# Patient Record
Sex: FEMALE | Race: WHITE | NOT HISPANIC OR LATINO | URBAN - METROPOLITAN AREA
[De-identification: names, ages, dates, MRNs, and addresses within clinical notes are randomized per-mention and may not be internally consistent; named-entity substitution may affect disease eponyms.]

---

## 2019-06-14 ENCOUNTER — OUTPATIENT (OUTPATIENT)
Dept: OUTPATIENT SERVICES | Facility: HOSPITAL | Age: 39
LOS: 1 days | End: 2019-06-14
Payer: MEDICAID

## 2019-06-14 ENCOUNTER — APPOINTMENT (OUTPATIENT)
Dept: OBGYN | Facility: CLINIC | Age: 39
End: 2019-06-14
Payer: MEDICAID

## 2019-06-14 VITALS
BODY MASS INDEX: 28 KG/M2 | HEIGHT: 64 IN | SYSTOLIC BLOOD PRESSURE: 115 MMHG | HEART RATE: 73 BPM | TEMPERATURE: 98.2 F | DIASTOLIC BLOOD PRESSURE: 74 MMHG | WEIGHT: 164 LBS

## 2019-06-14 DIAGNOSIS — Z78.9 OTHER SPECIFIED HEALTH STATUS: ICD-10-CM

## 2019-06-14 DIAGNOSIS — Z00.00 ENCOUNTER FOR GENERAL ADULT MEDICAL EXAMINATION WITHOUT ABNORMAL FINDINGS: ICD-10-CM

## 2019-06-14 DIAGNOSIS — F80.9 DEVELOPMENTAL DISORDER OF SPEECH AND LANGUAGE, UNSPECIFIED: ICD-10-CM

## 2019-06-14 DIAGNOSIS — G40.909 EPILEPSY, UNSPECIFIED, NOT INTRACTABLE, W/OUT STATUS EPILEPTICUS: ICD-10-CM

## 2019-06-14 DIAGNOSIS — F32.9 MAJOR DEPRESSIVE DISORDER, SINGLE EPISODE, UNSPECIFIED: ICD-10-CM

## 2019-06-14 PROCEDURE — 99203 OFFICE O/P NEW LOW 30 MIN: CPT

## 2019-06-14 NOTE — PHYSICAL EXAM
[Awake] : awake [Alert] : alert [Soft] : soft [Oriented x3] : oriented to person, place, and time [Normal] : uterus [Uterine Adnexae] : were not tender and not enlarged [No Bleeding] : there was no active vaginal bleeding [Acute Distress] : no acute distress [Mass] : no breast mass [Axillary LAD] : no axillary lymphadenopathy [Nipple Discharge] : no nipple discharge [Tender] : non tender

## 2019-06-14 NOTE — HISTORY OF PRESENT ILLNESS
[Lower-Rt-Q] : lower right quadrant [Lower-Lt-Q] : left lower quadrant [Sexually Active] : is sexually active [Male ___] : [unfilled] male [Irregular Menses] : irregular menses

## 2019-06-17 ENCOUNTER — RESULT REVIEW (OUTPATIENT)
Age: 39
End: 2019-06-17

## 2019-06-17 PROCEDURE — 87624 HPV HI-RISK TYP POOLED RSLT: CPT

## 2019-06-17 PROCEDURE — G0463: CPT

## 2019-06-18 DIAGNOSIS — N92.6 IRREGULAR MENSTRUATION, UNSPECIFIED: ICD-10-CM

## 2019-06-18 DIAGNOSIS — R10.2 PELVIC AND PERINEAL PAIN: ICD-10-CM

## 2019-06-18 LAB
C TRACH RRNA SPEC QL NAA+PROBE: SIGNIFICANT CHANGE UP
C TRACH+GC RRNA SPEC QL PROBE: SIGNIFICANT CHANGE UP
HPV HIGH+LOW RISK DNA PNL CVX: SIGNIFICANT CHANGE UP
N GONORRHOEA RRNA SPEC QL NAA+PROBE: SIGNIFICANT CHANGE UP

## 2019-06-19 LAB — CYTOLOGY SPEC DOC CYTO: SIGNIFICANT CHANGE UP

## 2019-06-28 ENCOUNTER — OUTPATIENT (OUTPATIENT)
Dept: OUTPATIENT SERVICES | Facility: HOSPITAL | Age: 39
LOS: 1 days | End: 2019-06-28
Payer: MEDICAID

## 2019-06-28 ENCOUNTER — APPOINTMENT (OUTPATIENT)
Dept: OBGYN | Facility: CLINIC | Age: 39
End: 2019-06-28
Payer: MEDICAID

## 2019-06-28 VITALS
HEART RATE: 78 BPM | TEMPERATURE: 98.2 F | SYSTOLIC BLOOD PRESSURE: 99 MMHG | WEIGHT: 163 LBS | DIASTOLIC BLOOD PRESSURE: 67 MMHG | BODY MASS INDEX: 27.83 KG/M2 | HEIGHT: 64 IN

## 2019-06-28 DIAGNOSIS — R10.2 PELVIC AND PERINEAL PAIN: ICD-10-CM

## 2019-06-28 DIAGNOSIS — Z00.00 ENCOUNTER FOR GENERAL ADULT MEDICAL EXAMINATION WITHOUT ABNORMAL FINDINGS: ICD-10-CM

## 2019-06-28 DIAGNOSIS — N92.6 IRREGULAR MENSTRUATION, UNSPECIFIED: ICD-10-CM

## 2019-06-28 PROCEDURE — G0463: CPT

## 2019-06-28 PROCEDURE — 99212 OFFICE O/P EST SF 10 MIN: CPT

## 2019-07-02 DIAGNOSIS — R10.2 PELVIC AND PERINEAL PAIN: ICD-10-CM

## 2019-07-02 DIAGNOSIS — N92.6 IRREGULAR MENSTRUATION, UNSPECIFIED: ICD-10-CM

## 2021-11-10 ENCOUNTER — INPATIENT (INPATIENT)
Facility: HOSPITAL | Age: 41
LOS: 22 days | Discharge: HOME | End: 2021-12-03
Attending: PSYCHIATRY & NEUROLOGY | Admitting: PSYCHIATRY & NEUROLOGY
Payer: MEDICAID

## 2021-11-10 VITALS
HEART RATE: 89 BPM | OXYGEN SATURATION: 100 % | TEMPERATURE: 99 F | RESPIRATION RATE: 16 BRPM | SYSTOLIC BLOOD PRESSURE: 111 MMHG | DIASTOLIC BLOOD PRESSURE: 62 MMHG

## 2021-11-10 DIAGNOSIS — R56.9 UNSPECIFIED CONVULSIONS: ICD-10-CM

## 2021-11-10 DIAGNOSIS — F79 UNSPECIFIED INTELLECTUAL DISABILITIES: ICD-10-CM

## 2021-11-10 DIAGNOSIS — F22 DELUSIONAL DISORDERS: ICD-10-CM

## 2021-11-10 DIAGNOSIS — F29 UNSPECIFIED PSYCHOSIS NOT DUE TO A SUBSTANCE OR KNOWN PHYSIOLOGICAL CONDITION: ICD-10-CM

## 2021-11-10 LAB
ALBUMIN SERPL ELPH-MCNC: 4.8 G/DL — SIGNIFICANT CHANGE UP (ref 3.3–5)
ALP SERPL-CCNC: 150 U/L — HIGH (ref 40–120)
ALT FLD-CCNC: 97 U/L — HIGH (ref 4–33)
ANION GAP SERPL CALC-SCNC: 14 MMOL/L — SIGNIFICANT CHANGE UP (ref 7–14)
APAP SERPL-MCNC: <5 UG/ML — LOW (ref 15–25)
AST SERPL-CCNC: 103 U/L — HIGH (ref 4–32)
BILIRUB SERPL-MCNC: 0.7 MG/DL — SIGNIFICANT CHANGE UP (ref 0.2–1.2)
BUN SERPL-MCNC: 17 MG/DL — SIGNIFICANT CHANGE UP (ref 7–23)
CALCIUM SERPL-MCNC: 9.7 MG/DL — SIGNIFICANT CHANGE UP (ref 8.4–10.5)
CHLORIDE SERPL-SCNC: 103 MMOL/L — SIGNIFICANT CHANGE UP (ref 98–107)
CK SERPL-CCNC: 3191 U/L — HIGH (ref 25–170)
CO2 SERPL-SCNC: 22 MMOL/L — SIGNIFICANT CHANGE UP (ref 22–31)
CREAT SERPL-MCNC: 0.68 MG/DL — SIGNIFICANT CHANGE UP (ref 0.5–1.3)
ETHANOL SERPL-MCNC: <10 MG/DL — SIGNIFICANT CHANGE UP
GLUCOSE SERPL-MCNC: 130 MG/DL — HIGH (ref 70–99)
HCG SERPL-ACNC: <5 MIU/ML — SIGNIFICANT CHANGE UP
HCT VFR BLD CALC: 41.9 % — SIGNIFICANT CHANGE UP (ref 34.5–45)
HGB BLD-MCNC: 13.4 G/DL — SIGNIFICANT CHANGE UP (ref 11.5–15.5)
MCHC RBC-ENTMCNC: 26.7 PG — LOW (ref 27–34)
MCHC RBC-ENTMCNC: 32 GM/DL — SIGNIFICANT CHANGE UP (ref 32–36)
MCV RBC AUTO: 83.5 FL — SIGNIFICANT CHANGE UP (ref 80–100)
NRBC # BLD: 0 /100 WBCS — SIGNIFICANT CHANGE UP
NRBC # FLD: 0 K/UL — SIGNIFICANT CHANGE UP
PCP SPEC-MCNC: SIGNIFICANT CHANGE UP
PLATELET # BLD AUTO: 368 K/UL — SIGNIFICANT CHANGE UP (ref 150–400)
POTASSIUM SERPL-MCNC: 3.5 MMOL/L — SIGNIFICANT CHANGE UP (ref 3.5–5.3)
POTASSIUM SERPL-SCNC: 3.5 MMOL/L — SIGNIFICANT CHANGE UP (ref 3.5–5.3)
PROT SERPL-MCNC: 9.2 G/DL — HIGH (ref 6–8.3)
RBC # BLD: 5.02 M/UL — SIGNIFICANT CHANGE UP (ref 3.8–5.2)
RBC # FLD: 14.2 % — SIGNIFICANT CHANGE UP (ref 10.3–14.5)
SALICYLATES SERPL-MCNC: <0.3 MG/DL — LOW (ref 15–30)
SARS-COV-2 RNA SPEC QL NAA+PROBE: SIGNIFICANT CHANGE UP
SODIUM SERPL-SCNC: 139 MMOL/L — SIGNIFICANT CHANGE UP (ref 135–145)
TOXICOLOGY SCREEN, DRUGS OF ABUSE, SERUM RESULT: SIGNIFICANT CHANGE UP
TSH SERPL-MCNC: 2.34 UIU/ML — SIGNIFICANT CHANGE UP (ref 0.27–4.2)
WBC # BLD: 10.94 K/UL — HIGH (ref 3.8–10.5)
WBC # FLD AUTO: 10.94 K/UL — HIGH (ref 3.8–10.5)

## 2021-11-10 PROCEDURE — 70450 CT HEAD/BRAIN W/O DYE: CPT | Mod: 26,MG

## 2021-11-10 PROCEDURE — G1004: CPT

## 2021-11-10 PROCEDURE — 99284 EMERGENCY DEPT VISIT MOD MDM: CPT | Mod: 25

## 2021-11-10 PROCEDURE — 99285 EMERGENCY DEPT VISIT HI MDM: CPT

## 2021-11-10 PROCEDURE — 93010 ELECTROCARDIOGRAM REPORT: CPT

## 2021-11-10 RX ORDER — TOPIRAMATE 25 MG
150 TABLET ORAL
Refills: 0 | Status: DISCONTINUED | OUTPATIENT
Start: 2021-11-11 | End: 2021-12-03

## 2021-11-10 RX ORDER — OLANZAPINE 15 MG/1
5 TABLET, FILM COATED ORAL EVERY 6 HOURS
Refills: 0 | Status: DISCONTINUED | OUTPATIENT
Start: 2021-11-11 | End: 2021-12-03

## 2021-11-10 RX ORDER — SODIUM CHLORIDE 9 MG/ML
1000 INJECTION, SOLUTION INTRAVENOUS ONCE
Refills: 0 | Status: COMPLETED | OUTPATIENT
Start: 2021-11-10 | End: 2021-11-10

## 2021-11-10 RX ORDER — SODIUM CHLORIDE 9 MG/ML
2000 INJECTION INTRAMUSCULAR; INTRAVENOUS; SUBCUTANEOUS ONCE
Refills: 0 | Status: COMPLETED | OUTPATIENT
Start: 2021-11-10 | End: 2021-11-10

## 2021-11-10 RX ORDER — TRIHEXYPHENIDYL HCL 2 MG
2 TABLET ORAL
Refills: 0 | Status: DISCONTINUED | OUTPATIENT
Start: 2021-11-11 | End: 2021-12-03

## 2021-11-10 RX ORDER — TRIHEXYPHENIDYL HCL 2 MG
2 TABLET ORAL ONCE
Refills: 0 | Status: COMPLETED | OUTPATIENT
Start: 2021-11-10 | End: 2021-11-10

## 2021-11-10 RX ORDER — RISPERIDONE 4 MG/1
3 TABLET ORAL ONCE
Refills: 0 | Status: COMPLETED | OUTPATIENT
Start: 2021-11-10 | End: 2021-11-10

## 2021-11-10 RX ORDER — RISPERIDONE 4 MG/1
3 TABLET ORAL AT BEDTIME
Refills: 0 | Status: DISCONTINUED | OUTPATIENT
Start: 2021-11-11 | End: 2021-12-03

## 2021-11-10 RX ORDER — TOPIRAMATE 25 MG
150 TABLET ORAL ONCE
Refills: 0 | Status: COMPLETED | OUTPATIENT
Start: 2021-11-10 | End: 2021-11-10

## 2021-11-10 RX ORDER — LAMOTRIGINE 25 MG/1
150 TABLET, ORALLY DISINTEGRATING ORAL ONCE
Refills: 0 | Status: COMPLETED | OUTPATIENT
Start: 2021-11-10 | End: 2021-11-10

## 2021-11-10 RX ORDER — LAMOTRIGINE 25 MG/1
150 TABLET, ORALLY DISINTEGRATING ORAL
Refills: 0 | Status: DISCONTINUED | OUTPATIENT
Start: 2021-11-11 | End: 2021-12-03

## 2021-11-10 RX ORDER — OLANZAPINE 15 MG/1
5 TABLET, FILM COATED ORAL ONCE
Refills: 0 | Status: DISCONTINUED | OUTPATIENT
Start: 2021-11-11 | End: 2021-12-03

## 2021-11-10 RX ORDER — RISPERIDONE 4 MG/1
2 TABLET ORAL DAILY
Refills: 0 | Status: DISCONTINUED | OUTPATIENT
Start: 2021-11-11 | End: 2021-11-15

## 2021-11-10 RX ADMIN — SODIUM CHLORIDE 2000 MILLILITER(S): 9 INJECTION INTRAMUSCULAR; INTRAVENOUS; SUBCUTANEOUS at 23:25

## 2021-11-10 NOTE — ED BEHAVIORAL HEALTH ASSESSMENT NOTE - OTHER PAST PSYCHIATRIC HISTORY (INCLUDE DETAILS REGARDING ONSET, COURSE OF ILLNESS, INPATIENT/OUTPATIENT TREATMENT)
Intellectual disabilities, communication disorder, MDD, unspecified psychotic disorder, delusional disorder, and unspecified anxiety, with 4 inpatient admission (last admission: [facility] 2020 in the setting of running away/unable to care for herself)

## 2021-11-10 NOTE — ED BEHAVIORAL HEALTH ASSESSMENT NOTE - AXIS III
migraines, seizures, dorsalgia, obesity, and h/o dislocation and sprain of joints and ligaments in the lumbar spine and pelvis, and possible Parkinson's disease (?)

## 2021-11-10 NOTE — CHART NOTE - NSCHARTNOTEFT_GEN_A_CORE
COVID Exposure Screen- Patient  1.	*Have you had a COVID-19 test in the last 90 days?  (  ) Yes   (  ) No   ( x) Unknown- Reason: _____  IF YES PROCEED TO QUESTION #2. IF NO OR UNKNOWN, PLEASE SKIP TO QUESTION #3.  2.	Date of test(s) and result(s): ________  3.	*Have you tested positive for COVID-19 antibodies? (  ) Yes   (  ) No   ( x ) Unknown- Reason: _____  IF YES PROCEED TO QUESTION #4. IF NO or UNKNOWN, PLEASE SKIP TO QUESTION #5.  4.	Date of positive antibody test: ________  5.	*Have you received COVID-19 vaccine? ( x ) Yes   (  ) No   (  ) Unknown- Reason: _____   IF YES PROCEED TO QUESTION #6. IF NO or UNKNOWN, PLEASE SKIP TO QUESTION #7.  6.	Date of dose: Martinez Vaccine on 5/11/2021    7.	*In the past 10 days, have you been around anyone with a positive COVID-19 test?* (  ) Yes   (  ) No   (x  ) Unknown- Reason: ____  IF YES PROCEED TO QUESTION #8. IF NO or UNKNOWN, PLEASE SKIP TO QUESTION #13.  8.	Were you within 6 feet of them for at least 15 minutes? (  ) Yes   (  ) No   (  ) Unknown- Reason: _____  9.	Have you provided care for them? (  ) Yes   (  ) No   (  ) Unknown- Reason: ______  10.	Have you had direct physical contact with them (touched, hugged, or kissed them)? (  ) Yes   (  ) No    (  ) Unknown- Reason: _____  11.	Have you shared eating or drinking utensils with them? (  ) Yes   (  ) No    (  ) Unknown- Reason: ____  12.	Have they sneezed, coughed, or somehow gotten respiratory droplets on you? (  ) Yes   (  ) No    (  ) Unknown- Reason: ______  13.	*Have you been out of [place] within the past 10 days?* (  ) Yes   (  x) No   (  ) Unknown- Reason: _____  IF YES PLEASE ANSWER THE FOLLOWING QUESTIONS:  14.	Which state/country have you been to? ______  15.	Were you there over 24 hours? (  ) Yes   (  ) No    (  ) Unknown- Reason: ______  16.	Date of return to [facility]: ______

## 2021-11-10 NOTE — ED BEHAVIORAL HEALTH ASSESSMENT NOTE - HPI (INCLUDE ILLNESS QUALITY, SEVERITY, DURATION, TIMING, CONTEXT, MODIFYING FACTORS, ASSOCIATED SIGNS AND SYMPTOMS)
Ms. Francois is a 42yo woman, primarily Slovenian-speaking (writer speaks Slovenian, no  used), ,  non-dependents, domiciled with , with PPHx of Intellectual disabilities, communication disorder, MDD, unspecified psychotic disorder, delusional disorder, and unspecified anxiety, with 4 inpatient admission (last admission: Jacobi 2020 in the setting of running away/unable to care for herself), reports no previous SAs/NSSB, denies any violence/legal issues/substance use, with PMHX of migraines, seizures, dorsalgia, obesity, and h/o dislocation and sprain of joints and ligaments in the lumbar spine and pelvis, was BIBEMS to [facility] after patient was wondering around at a  police precinct.     The patient reports she lives in the [place] with her , Ravi. Notes they got into an argument, which Ravi telling her to go look for her boyfriend. Patient states she did as such, she left the house with the hopes of going to [place] to find Jono, her high school boyfriend. Asked how does she know his location, she responds that “someone told me.” Asked who is this someone, she continues to repeat, “someone, someone.” When asked how she was planning to reach [place], she response “God.” The patient notes she has been  to Ravi for years, they have no children, and her parents and siblings live in [place].   The patient reports her mood as “happy.” Endorses good sleep, appetite, and states she enjoys going to parties and dancing. Denies any feelings of guilt or any current or past SIIP/HIIP/thoughts of self-harm. Denies any current or past AVH, IOR, thought insertion/deletion. Notes her  “follows” her around in the street. Denies any manic symptoms. Reports h/o “seizures attacks,” for which she takes meds, but denies any other medical conditions. Denies any etoh/tobacco/or illicit drug use. Reports allergy to fish (facial and limb edema).     Patient’s currents med: Lamictal 150mg BID, Topiramate 150mg BID, Risperidal 2mg daily and 3mg qHS, and Trihexyphenidyl 2mg BID w/ meals.     Per collateral her , Mr. Ravi Mitchell, patient was has history of seizures and tremors, speech problem from childhood, intellectual disability, and some type of memory problem he is unclear about (perhaps Parkinson's disease based on h/o tremors and use of Artane?). They have been  for more than 10 years. Patient’s mom passed away, father lives in [place], and brother lives in [place]. Per , about two days ago, while he was doing the laundry the patient left the house without telling  where she was going. States patient last year left the house in search of her brother and ended up in [place]. Patient received the COVID19 Martinez vaccine on May 11, 2021. Ms. Francois is a 42yo woman, primarily Solomon Islander-speaking (writer speaks Solomon Islander, no  used), ,  no dependents, domiciled with , with PPHx of Intellectual disabilities, communication disorder, MDD, unspecified psychotic disorder, delusional disorder, and unspecified anxiety, with 4 inpatient admission (last admission: Jacobi 2020 in the setting of running away/unable to care for herself), reports no previous SAs/NSSB, denies any violence/legal issues/substance use, with PMHX of migraines, seizures, dorsalgia, obesity, and h/o dislocation and sprain of joints and ligaments in the lumbar spine and pelvis, was BIBEMS to [facility] after patient was wondering around at a  police precinct.     The patient reports she lives in the [place] with her , Ravi. Notes they got into an argument, which Ravi telling her to go look for her boyfriend. Patient states she did as such, she left the house with the hopes of going to [place] to find Jono, her high school boyfriend. Asked how does she know his location, she responds that “someone told me.” Asked who is this someone, she continues to repeat, “someone, someone.” When asked how she was planning to reach [place], she response “God.” The patient notes she has been  to Ravi for years, they have no children, and her parents and siblings live in [place].   The patient reports her mood as “happy.” Endorses good sleep, appetite, and states she enjoys going to parties and dancing. Denies any feelings of guilt or any current or past SIIP/HIIP/thoughts of self-harm. Denies any current or past AVH, IOR, thought insertion/deletion. Notes her  “follows” her around in the street. Denies any manic symptoms. Reports h/o “seizures attacks,” for which she takes meds, but denies any other medical conditions. Denies any etoh/tobacco/or illicit drug use. Reports allergy to fish (facial and limb edema).     Patient’s currents med: Lamictal 150mg BID, Topiramate 150mg BID, Risperidal 2mg daily and 3mg qHS, and Trihexyphenidyl 2mg BID w/ meals.     Per collateral her , Mr. Ravi Mitchell, patient was has history of seizures and tremors, speech problem from childhood, intellectual disability, and some type of memory problem he is unclear about (perhaps Parkinson's disease based on h/o tremors and use of Artane?). They have been  for more than 10 years. Patient’s mom passed away, father lives in [place], and brother lives in [place]. Per , about two days ago, while he was doing the laundry the patient left the house without telling  where she was going. States patient last year left the house in search of her brother and ended up in [place]. Patient received the COVID19 Martinez vaccine on May 11, 2021. Ms. Francois is a 42yo woman, primarily Icelandic-speaking (writer speaks Icelandic, no  used), ,  no dependents, domiciled with , with PPHx of Intellectual disability, communication disorder, MDD, unspecified psychotic disorder, delusional disorder, and unspecified anxiety, with 4 inpatient admissions (last admission: Jacobi 2020 in the setting of running away/unable to care for herself), reports no previous SAs/NSSB, denies any h/o violence/legal issues/substance use, with PMHX of migraines, seizures, dorsalgia, obesity, and h/o dislocation and sprain of joints and ligaments in the lumbar spine and pelvis, was BIBEMS to [facility] after patient was wondering around at a  police precinct.     The patient reports she lives in the [place] with her , Ravi. Notes they got into an argument, with Ravi telling her to go look for her boyfriend. Patient states she did as such, she left the house with the hopes of going to [place] to find Jono, her high school boyfriend. Asked how does she know his location, she responds that “someone told me.” Asked who is this someone, she continues to repeat, “someone, someone.” When asked how she was planning to reach [place], she response “God.” The patient notes she has been  to Ravi for years, they have no children, and her parents and siblings live in [place].   The patient reports her mood as “happy.” Endorses good sleep, appetite, and states she enjoys going to parties and dancing. Denies any feelings of guilt or any current or past SIIP/HIIP/thoughts of self-harm. Denies any current or past AVH, IOR, thought insertion/deletion. Notes her  “follows” her around in the street. Denies any manic symptoms. Reports h/o “seizures attacks,” for which she takes meds, but denies any other medical conditions. Denies any etoh/tobacco/or illicit drug use. Reports allergy to fish (facial and limb edema).     Patient’s currents med: Lamictal 150mg BID, Topiramate 150mg BID, Risperidal 2mg daily and 3mg qHS, and Trihexyphenidyl 2mg BID w/ meals.     Per collateral her , Mr. Ravi Mitchell, patient was has history of seizures and tremors, speech problem from childhood, intellectual disability, and some type of memory problem he is unclear about (perhaps Parkinson's disease based on h/o tremors and use of Artane?). They have been  for more than 10 years. Patient’s mom passed away, father lives in [place], and brother lives in [place]. Per , about two days ago, while he was doing the laundry the patient left the house without telling  where she was going. States patient last year left the house in search of her brother and ended up in [place]. Patient received the COVID19 Martinez vaccine on May 11, 2021. Ms. Francois is a 40yo woman, primarily Mongolian-speaking (writer speaks Mongolian, no  used), ,  no dependents, domiciled with , with PPHx of Intellectual disability, communication disorder, MDD, unspecified psychotic disorder, delusional disorder, and unspecified anxiety, with 4 inpatient admissions (last admission: Jacobi 2020 in the setting of running away/unable to care for herself), reports no previous SAs/NSSB, denies any h/o violence/legal issues/substance use, with PMHX of migraines, seizures, dorsalgia, obesity, and h/o dislocation and sprain of joints and ligaments in the lumbar spine and pelvis, was BIBEMS to [facility] after patient was wondering around at a police precinct.     The patient reports she lives in the [place] with her , Ravi. Notes they got into an argument, with Ravi telling her to go look for her boyfriend. Patient states she did as such, she left the house with the hopes of going to [place] to find Jono, her high school boyfriend. Asked how does she know his location, she responds that “someone told me.” Asked who is this someone, she continues to repeat, “someone, someone.” When asked how she was planning to reach [place], she response “God.” The patient notes she has been  to Ravi for years, they have no children, and her parents and siblings live in [place].   The patient reports her mood as “happy.” Endorses good sleep, appetite, and states she enjoys going to parties and dancing. Denies any feelings of guilt or any current or past SIIP/HIIP/thoughts of self-harm. Denies any current or past AVH, IOR, thought insertion/deletion. Notes her  “follows” her around in the street. Denies any manic symptoms. Reports h/o “seizures attacks,” for which she takes meds, but denies any other medical conditions. Denies any etoh/tobacco/or illicit drug use. Reports allergy to fish (facial and limb edema).     Patient’s currents med: Lamictal 150mg BID, Topiramate 150mg BID, Risperidal 2mg daily and 3mg qHS, and Trihexyphenidyl 2mg BID w/ meals.     Per collateral her , Mr. Ravi Mitchell, patient was has history of seizures and tremors, speech problem from childhood, intellectual disability, and some type of memory problem he is unclear about (perhaps Parkinson's disease based on h/o tremors and use of Artane?). They have been  for more than 10 years. Patient’s mom passed away, father lives in [place], and brother lives in [place]. Per , about two days ago, while he was doing the laundry the patient left the house without telling  where she was going. States patient last year left the house in search of her brother and ended up in [place]. Patient received the COVID19 Martinez vaccine on May 11, 2021.

## 2021-11-10 NOTE — ED PROVIDER NOTE - OBJECTIVE STATEMENT
This is a 41 yr old F, pmh bpd, seizures with c/o bizarre behaviour, delusions. Pt arrived with ems and PD. They told she disappeared from home for the last 2 days, bizarre, not taking medications.   She reports she left her  This is a 41 yr old F, pmh bpd, seizures with c/o bizarre behaviour, delusions. Pt arrived with ems and PD. They told she disappeared from home for the last 2 days, bizarre, not taking medications.   She reports she left her , because she went to be with her boyfriend. She does not want to live with her  anymore.   Slow to respond, delusional.

## 2021-11-10 NOTE — ED BEHAVIORAL HEALTH ASSESSMENT NOTE - SUMMARY
40yo woman, ,  non-dependents, domiciled with , with PPHx of Intellectual disabilities, communication disorder, MDD, unspecified psychotic disorder, delusional disorder, and unspecified anxiety, with 4 inpatient admission (last admission: Jacobi 2020 in the setting of running away/unable to care for herself), reports no previous SAs/NSSB, denies any violence/legal issues/substance use, with PMHX of migraines, seizures, dorsalgia, obesity, and h/o dislocation and sprain of joints and ligaments in the lumbar spine and pelvis, [facility] around at a  police precinct.    Pt calm and cooperative on exam. With noted speech impairment, which makes it challenging to understand her even when speaking Citizen of [place]. Pt insist that she left her house to look for her HS boyfriend in [place]. Per , she randomly develops delusions about family being in trouble having to go look for them or goes missing in search of said HS boyfriend. Currently no gross AVH, paranoia, OIR, manic/depressive symptoms were noted on exam. However, overall patient with  poor insight and judgment and is unable to care for herself. At this time, patient requires inpatient admission for stabilization, safety, and medication management.     Plan:  -admit to [facility],  inpatient unit, 9.13 legal status    -Re-start home meds: Lamictal 150mg BID, Topiramate 150mg BID, Risperidal 2mg daily and 3mg qHS, and Trihexyphenidyl 2mg BID w/ meals.  -PRNS: Ativan 2mg PO/IM q4h prn, Zyprexa 5mg PO/IM q6h prn, Benadryl 50mg PO/IM qhr for anxiety/agitation 40yo woman, ,  non-dependents, domiciled with , with PPHx of Intellectual disabilities, communication disorder, MDD, unspecified psychotic disorder, delusional disorder, and unspecified anxiety, with 4 inpatient admission (last admission: Jacobi 2020 in the setting of running away/unable to care for herself), reports no previous SAs/NSSB, denies any violence/legal issues/substance use, with PMHX of migraines, seizures, dorsalgia, obesity, and h/o dislocation and sprain of joints and ligaments in the lumbar spine and pelvis, [facility] at a  police precinct.    Pt calm and cooperative on exam. With noted speech impairment, which makes it challenging to understand her even when speaking Romanian. Pt insist that she left her house to look for her HS boyfriend in [place]. Per , she randomly develops delusions about family being in trouble having to go look for them or goes missing in search of said HS boyfriend. Currently no gross AVH, paranoia, OIR, manic/depressive symptoms were noted on exam. However, overall patient with  poor insight and judgment and is unable to care for herself. At this time, patient requires inpatient admission for stabilization, safety, and medication management.     *Of Note: in the ED, pt with CK elevated to 3100. Pt to receive IV fluids & repeat CK level check     Plan:  -admit to [facility],  inpatient unit, 9.13 legal status    -Re-start home meds: Lamictal 150mg BID, Topiramate 150mg BID, Risperidal 2mg daily and 3mg qHS, and Trihexyphenidyl 2mg BID w/ meals.  -PRNS: Ativan 2mg PO/IM q4h prn, Zyprexa 5mg PO/IM q6h prn, Benadryl 50mg PO/IM qhr for anxiety/agitation 40yo woman, ,  non-dependents, domiciled with , with PPHx of Intellectual disabilities, communication disorder, MDD, unspecified psychotic disorder, delusional disorder, and unspecified anxiety, with 4 inpatient admission (last admission: Jacobi 2020 in the setting of running away/unable to care for herself), reports no previous SAs/NSSB, denies any violence/legal issues/substance use, with PMHX of migraines, seizures, dorsalgia, obesity, and h/o dislocation and sprain of joints and ligaments in the lumbar spine and pelvis, [facility] at a  police precinct.    Pt calm and cooperative on exam. With noted speech impairment, which makes it challenging to understand her even when speaking Austrian. Pt insists that she left her house to look for her HS boyfriend in [place]. Per , she randomly develops delusions about family being in danger and then goes looking for them or goes missing in search of her HS boyfriend. Currently no gross AVH, paranoia, OIR, manic/depressive symptoms were noted on exam. However, overall patient with  poor insight and judgment and is unable to care for herself. At this time, patient requires inpatient admission for stabilization, safety, and medication management.     **Of Note: in the ED, pt with CK elevated to 3100. Pt to receive IV fluids & repeat CK level check     Plan:  -admit to [facility],  inpatient unit, 9.13 legal status    -Re-start home meds: Lamictal 150mg BID, Topiramate 150mg BID, Risperidal 2mg daily and 3mg qHS, and Trihexyphenidyl 2mg BID w/ meals.  -PRNS: Ativan 2mg PO/IM q4h prn, Zyprexa 5mg PO/IM q6h prn, Benadryl 50mg PO/IM qhr for anxiety/agitation 42yo woman, ,  non-dependents, domiciled with , with PPHx of Intellectual disabilities, communication disorder, MDD, unspecified psychotic disorder, delusional disorder, and unspecified anxiety, with 4 inpatient admission (last admission: Jacobi 2020 in the setting of running away/unable to care for herself), reports no previous SAs/NSSB, denies any violence/legal issues/substance use, with PMHX of migraines, seizures, dorsalgia, obesity, and h/o dislocation and sprain of joints and ligaments in the lumbar spine and pelvis, [facility] around at a  police precinct.    Pt calm and cooperative on exam. With noted speech impairment, which makes it challenging to understand her even when speaking Nam[place]. Pt insists that she left her house to look for her HS boyfriend in [place]. Per , she randomly develops delusions about family being in danger and then goes looking for them or goes missing in search of her HS boyfriend. Currently no gross AVH, paranoia, OIR, manic/depressive symptoms were noted on exam. However, overall patient with  poor insight and judgment and is unable to care for herself. At this time, patient requires inpatient admission for stabilization, safety, and medication management and agreeable to sign in voluntarily.     **Of Note: in the ED, pt with CK elevated to 3100. Pt to receive IV fluids & repeat CK level check     Plan:  -admit to [facility],  inpatient unit, 9.13 legal status    -Re-start home meds: Lamictal 150mg BID, Topiramate 150mg BID, Risperidal 2mg daily and 3mg qHS, and Trihexyphenidyl 2mg BID w/ meals.  -PRNS: Ativan 2mg PO/IM q4h prn, Zyprexa 5mg PO/IM q6h prn, Benadryl 50mg PO/IM qhr for anxiety/agitation

## 2021-11-10 NOTE — ED BEHAVIORAL HEALTH ASSESSMENT NOTE - CURRENT MEDICATION
Lamictal 150mg BID, Topiramate 150mg BID, Risperidal 2mg daily and 3mg qHS, and Trihexyphenidyl 2mg BID w/ meals.

## 2021-11-10 NOTE — ED BEHAVIORAL HEALTH ASSESSMENT NOTE - CASE SUMMARY
40yo woman, ,  non-dependents, domiciled with , with PPHx of Intellectual disabilities, communication disorder, MDD, unspecified psychotic disorder, delusional disorder, and unspecified anxiety, with 4 inpatient admission (last admission: [facility]i 2020 in the setting of running away/unable to care for herself), reports no previous SAs/NSSB, denies any violence/legal issues/substance use, with PMHX of migraines, seizures, dorsalgia, obesity, and h/o dislocation and sprain of joints and ligaments in the lumbar spine and pelvis, was BIBEMS to [facility] after patient was wondering around at a  police precinct.  Patient presented to the ED after she was found wandering. As per  and patient she was in search of a high school boyfriend (delusion). Patient does not take good care of herself, has been engaging in dangerous behavior but has maintained adherence to recommended treatment. Patient with need for medication adjustment and stabilization. She is agreeable to signing in voluntarily. Pt found to have elevated CK (most likely from excessive walking/wandering) and required IV hydration in ED. Once stabilized, patient to be admitted to L4 on 9.13 status. C/w home meds. Inpatient team to make appropriate adjustments.

## 2021-11-10 NOTE — ED PROVIDER NOTE - CLINICAL SUMMARY MEDICAL DECISION MAKING FREE TEXT BOX
This is a 41 yr old F, pmh bpd, seizures with c/o bizarre behaviour, delusions. Pt arrived with ems and PD. They told she disappeared from home for the last 2 days, bizarre, not taking medications.   She reports she left her , because she went to be with her boyfriend. She does not want to live with her  anymore.   Slow to respond, delusional.  collateral info obtained from father Ifeanyi ( [phone]) poor historian but was able to give husbands - Ravi # [phone], who reports as follows wife is with mood disorder and seizures, she is not doing well for the last couple of month. About 2 month ago she started to be fixated about a long time boyfriend 20 years ago. She wanted to go to [place] to be with that ex boyfriend. He was doing laundry yesterday and he was seeing his wife leaving the house. He got a phone call from  a bank on [facility] and they reported to him, wife was on the bench just sitting there since yesterday.   He reports they are seeing a neurologist Dr Carver.   labs, ct head, psych consult.

## 2021-11-10 NOTE — ED BEHAVIORAL HEALTH ASSESSMENT NOTE - RISK ASSESSMENT
The patient has a low acute risk for suicide. Risk factors include: current delusions, h/o psychosis, h/o multiple hospitalizations, intellectual disability, communication barriers 2/2 speech impediment, and social stressors.  Protective factors include future orientation, identifies reasons for living, support from partner, help seeking, no h/o SA/NSSB, and lack of access. Immediate risk may be minimized by inpatient admission to a safe environment with appropriate supervision and limited access to lethal means. Low Acute Suicide Risk

## 2021-11-10 NOTE — ED PROVIDER NOTE - PROGRESS NOTE DETAILS
LARISSA France- elevated ck 3191, ivf hydration, recheck ck and cmp after Received pt on sign out follow up repeat cpk. After IVF, cpk decreased. SPoke with  attending Dr Padilla,  pt may go to [facility] since its down trending. will have repeat labs at L4. pt tolerating po.

## 2021-11-10 NOTE — ED BEHAVIORAL HEALTH ASSESSMENT NOTE - DESCRIPTION
pt lives with  in the [place], her mother passed away, her father lives in [place] and her brother lives in [place] Pt calm and cooperative. No agitation, no IMs used.       Vital Signs Last 24 Hrs  T(C): 37.1 (11-10-21 @ 20:05), Max: 37.1 (11-10-21 @ 20:05)  T(F): 98.7 (11-10-21 @ 20:05), Max: 98.7 (11-10-21 @ 20:05)  HR: 89 (11-10-21 @ 20:05) (89 - 89)  BP: 111/62 (11-10-21 @ 20:05) (111/62 - 111/62)  BP(mean): --  RR: 16 (11-10-21 @ 20:05) (16 - 16)  SpO2: 100% (11-10-21 @ 20:05) (100% - 100%) migraines, seizures, dorsalgia, obesity, and h/o dislocation and sprain of joints and ligaments in the lumbar spine and pelvis Pt calm and cooperative. No agitation, no IMs used.     Vital Signs Last 24 Hrs  T(C): 37.1 (11-10-21 @ 20:05), Max: 37.1 (11-10-21 @ 20:05)  T(F): 98.7 (11-10-21 @ 20:05), Max: 98.7 (11-10-21 @ 20:05)  HR: 89 (11-10-21 @ 20:05) (89 - 89)  BP: 111/62 (11-10-21 @ 20:05) (111/62 - 111/62)  BP(mean): --  RR: 16 (11-10-21 @ 20:05) (16 - 16)  SpO2: 100% (11-10-21 @ 20:05) (100% - 100%)

## 2021-11-10 NOTE — ED ADULT TRIAGE NOTE - CHIEF COMPLAINT QUOTE
Pt found at 111 precint after missing for 2 days.  Pt non compliant with meds.  Denies SI. HI, hallucinations

## 2021-11-11 DIAGNOSIS — F29 UNSPECIFIED PSYCHOSIS NOT DUE TO A SUBSTANCE OR KNOWN PHYSIOLOGICAL CONDITION: ICD-10-CM

## 2021-11-11 LAB
AMPHET UR-MCNC: NEGATIVE — SIGNIFICANT CHANGE UP
ANISOCYTOSIS BLD QL: SLIGHT — SIGNIFICANT CHANGE UP
APPEARANCE UR: CLEAR — SIGNIFICANT CHANGE UP
BACTERIA # UR AUTO: ABNORMAL
BARBITURATES UR SCN-MCNC: NEGATIVE — SIGNIFICANT CHANGE UP
BENZODIAZ UR-MCNC: NEGATIVE — SIGNIFICANT CHANGE UP
BILIRUB UR-MCNC: NEGATIVE — SIGNIFICANT CHANGE UP
CK SERPL-CCNC: 2676 U/L — HIGH (ref 25–170)
COCAINE METAB.OTHER UR-MCNC: NEGATIVE — SIGNIFICANT CHANGE UP
COLOR SPEC: YELLOW — SIGNIFICANT CHANGE UP
COVID-19 SPIKE DOMAIN AB INTERP: POSITIVE
COVID-19 SPIKE DOMAIN ANTIBODY RESULT: >250 U/ML — HIGH
CREATININE URINE RESULT, DAU: 101 MG/DL — SIGNIFICANT CHANGE UP
DIFF PNL FLD: NEGATIVE — SIGNIFICANT CHANGE UP
GLUCOSE UR QL: NEGATIVE — SIGNIFICANT CHANGE UP
KETONES UR-MCNC: ABNORMAL
LEUKOCYTE ESTERASE UR-ACNC: NEGATIVE — SIGNIFICANT CHANGE UP
MANUAL SMEAR VERIFICATION: SIGNIFICANT CHANGE UP
METHADONE UR-MCNC: NEGATIVE — SIGNIFICANT CHANGE UP
NITRITE UR-MCNC: NEGATIVE — SIGNIFICANT CHANGE UP
OPIATES UR-MCNC: NEGATIVE — SIGNIFICANT CHANGE UP
OXYCODONE UR-MCNC: NEGATIVE — SIGNIFICANT CHANGE UP
PCP SPEC-MCNC: SIGNIFICANT CHANGE UP
PCP UR-MCNC: NEGATIVE — SIGNIFICANT CHANGE UP
PH UR: 8 — SIGNIFICANT CHANGE UP (ref 5–8)
PLAT MORPH BLD: NORMAL — SIGNIFICANT CHANGE UP
PLATELET COUNT - ESTIMATE: NORMAL — SIGNIFICANT CHANGE UP
POIKILOCYTOSIS BLD QL AUTO: SLIGHT — SIGNIFICANT CHANGE UP
PROT UR-MCNC: ABNORMAL
RBC BLD AUTO: ABNORMAL
SARS-COV-2 IGG+IGM SERPL QL IA: >250 U/ML — HIGH
SARS-COV-2 IGG+IGM SERPL QL IA: POSITIVE
SP GR SPEC: 1.02 — SIGNIFICANT CHANGE UP (ref 1–1.05)
THC UR QL: NEGATIVE — SIGNIFICANT CHANGE UP
UROBILINOGEN FLD QL: ABNORMAL
VARIANT LYMPHS # BLD: 3.6 % — SIGNIFICANT CHANGE UP (ref 0–6)
WBC UR QL: 2 /HPF — SIGNIFICANT CHANGE UP (ref 0–5)

## 2021-11-11 RX ADMIN — LAMOTRIGINE 150 MILLIGRAM(S): 25 TABLET, ORALLY DISINTEGRATING ORAL at 20:59

## 2021-11-11 RX ADMIN — RISPERIDONE 2 MILLIGRAM(S): 4 TABLET ORAL at 09:31

## 2021-11-11 RX ADMIN — RISPERIDONE 3 MILLIGRAM(S): 4 TABLET ORAL at 01:02

## 2021-11-11 RX ADMIN — LAMOTRIGINE 150 MILLIGRAM(S): 25 TABLET, ORALLY DISINTEGRATING ORAL at 09:30

## 2021-11-11 RX ADMIN — RISPERIDONE 3 MILLIGRAM(S): 4 TABLET ORAL at 20:59

## 2021-11-11 RX ADMIN — Medication 2 MILLIGRAM(S): at 21:00

## 2021-11-11 RX ADMIN — Medication 2 MILLIGRAM(S): at 09:31

## 2021-11-11 RX ADMIN — Medication 150 MILLIGRAM(S): at 01:01

## 2021-11-11 RX ADMIN — LAMOTRIGINE 150 MILLIGRAM(S): 25 TABLET, ORALLY DISINTEGRATING ORAL at 01:02

## 2021-11-11 RX ADMIN — Medication 150 MILLIGRAM(S): at 09:30

## 2021-11-11 RX ADMIN — SODIUM CHLORIDE 1000 MILLILITER(S): 9 INJECTION, SOLUTION INTRAVENOUS at 00:28

## 2021-11-11 RX ADMIN — Medication 2 MILLIGRAM(S): at 01:01

## 2021-11-11 RX ADMIN — Medication 150 MILLIGRAM(S): at 21:00
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
## 2021-11-11 NOTE — BH INPATIENT PSYCHIATRY ASSESSMENT NOTE - NSICDXBHSECONDARYDX_PSY_ALL_CORE
Delusional disorder   F22  Psychotic disorder with delusions   F29  Intellectual disability   F79  Seizures   R56.9

## 2021-11-11 NOTE — BH INPATIENT PSYCHIATRY ASSESSMENT NOTE - RISK ASSESSMENT
The patient has a low acute risk for suicide. Risk factors include: current delusions, h/o psychosis, h/o multiple hospitalizations, intellectual disability, communication barriers 2/2 speech impediment, and social stressors.  Protective factors include future orientation, identifies reasons for living, support from partner, help seeking, no h/o SA/NSSB, and lack of access. Immediate risk may be minimized by inpatient admission to a safe environment with appropriate supervision and limited access to lethal means.

## 2021-11-11 NOTE — ED BEHAVIORAL HEALTH NOTE - BEHAVIORAL HEALTH NOTE
Spoke with DR. Quezada, CPK is trending down, pt is medically cleared. Recommend to repeat CPK daily to trend down level , and encourage fluids intake.

## 2021-11-11 NOTE — PSYCHIATRIC REHAB INITIAL EVALUATION - NSBHPRRECOMMEND_PSY_ALL_CORE
Patient is a 41 year old, , unemployed,  female, admitted to [facility] 3 unit on 11/10/21, with an admitting diagnosis of MDD, Delusional Disorder, hx of intellectual disability, communication issues, anxiety, with no known substance abuse, violence, or suicide attempts. Patient has been increasingly disorganized and delusional, and was brought to the hospital by EMS after she was found wandering around a police precinct. Patient is disorganized and childlike, with poor insight and judgment. Patient and writer were unable to establish a collaborative rehabilitation goal, therefore an appropriate goal will be selected for patient. Psychiatric rehabilitation staff will continue to provide ongoing support and encouragement.     Due the Covid-19 pandemic, unit structure and activities are revaluated on the regular basis in effort to maintain the safety of our patients and staff.

## 2021-11-11 NOTE — BH INPATIENT PSYCHIATRY ASSESSMENT NOTE - CURRENT MEDICATION
MEDICATIONS  (STANDING):  lamoTRIgine 150 milliGRAM(s) Oral two times a day  risperiDONE   Tablet 2 milliGRAM(s) Oral daily  risperiDONE   Tablet 3 milliGRAM(s) Oral at bedtime  topiramate 150 milliGRAM(s) Oral two times a day  trihexyphenidyl 2 milliGRAM(s) Oral two times a day    MEDICATIONS  (PRN):  OLANZapine 5 milliGRAM(s) Oral every 6 hours PRN agitation  OLANZapine Injectable 5 milliGRAM(s) IntraMuscular Once PRN severe agitation
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
## 2021-11-11 NOTE — BH INPATIENT PSYCHIATRY ASSESSMENT NOTE - NSBHMETABOLIC_PSY_ALL_CORE_FT
BMI: BMI (kg/m2): 30.9 (11-11-21 @ 06:11)  HbA1c:   Glucose:   BP: 112/64 (11-11-21 @ 05:33) (111/62 - 115/53)  Lipid Panel:

## 2021-11-11 NOTE — BH SOCIAL WORK INITIAL PSYCHOSOCIAL EVALUATION - FAMILY HISTORY OF PSYCHIATRIC ILLNESS / SUICIDALITY
Lets get a nocturnal pulse ox test to be sure your oxygen levels do not drop in your sleep as it often does in patients with PH    Show Dr Major your finger next week    Call us if you find yourself getting more short of breath, have more swelling or unexpected weight changes    PH on Facebook: A couple of our patients created a group on Facebook for people living in [place] with PH, it's called [place] PHriends.    Check it out!      PH Support group:    June 19, 2019     11:00 AM - 1:00 PM  [employer]  [facility] Garrick Suarez.  Topic: Nutrition, Nutrition Bingo and Meal of Fortune Game.  Meeting sponsor is [employer]    RSVP to Jasmyn Knapp [phone] or [email or web address] by 6/17/19 Friday June 28, 2019   6:30 pm  Tips for Daily Living with PH  [employer]   [address] [place] [address]    Contact your [employer] [facility] , [place], by cellphone [phone], or email [email or web address] to RSVP           Sept 18 dinner- speaker Celsa Mclain- time TBD  [place]  [facility]  Sponsored by [employer]     Yes

## 2021-11-11 NOTE — BH INPATIENT PSYCHIATRY ASSESSMENT NOTE - NSDCCRITERIA_PSY_ALL_CORE
CGI <=2, return to baseline fucntioning as evidenced by behavioral control, staff observations, pt self report

## 2021-11-11 NOTE — BH INPATIENT PSYCHIATRY ASSESSMENT NOTE - DESCRIPTION
pt lives with  in the [place], her mother passed away, her father lives in [place] and her brother lives in [place]
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
## 2021-11-11 NOTE — BH INPATIENT PSYCHIATRY ASSESSMENT NOTE - NSBHCHARTREVIEWVS_PSY_A_CORE FT
Vital Signs Last 24 Hrs  T(C): 36.6 (11-11-21 @ 06:11), Max: 37.1 (11-10-21 @ 20:05)  T(F): 97.8 (11-11-21 @ 06:11), Max: 98.7 (11-10-21 @ 20:05)  HR: 85 (11-11-21 @ 05:33) (85 - 97)  BP: 112/64 (11-11-21 @ 05:33) (111/62 - 115/53)  BP(mean): --  RR: 16 (11-11-21 @ 05:33) (15 - 16)  SpO2: 100% (11-11-21 @ 05:33) (95% - 100%)    Orthostatic VS  11-11-21 @ 06:11  Lying BP: --/-- HR: --  Sitting BP: 107/59 HR: 70  Standing BP: 109/64 HR: 101  Site: --  Mode: --

## 2021-11-12 LAB
A1C WITH ESTIMATED AVERAGE GLUCOSE RESULT: 6 % — HIGH (ref 4–5.6)
CHOLEST SERPL-MCNC: 145 MG/DL — SIGNIFICANT CHANGE UP
CK SERPL-CCNC: 1334 U/L — HIGH (ref 25–170)
ESTIMATED AVERAGE GLUCOSE: 126 — SIGNIFICANT CHANGE UP
HDLC SERPL-MCNC: 37 MG/DL — LOW
LIPID PNL WITH DIRECT LDL SERPL: 85 MG/DL — SIGNIFICANT CHANGE UP
NON HDL CHOLESTEROL: 108 MG/DL — SIGNIFICANT CHANGE UP
TRIGL SERPL-MCNC: 114 MG/DL — SIGNIFICANT CHANGE UP

## 2021-11-12 RX ADMIN — Medication 150 MILLIGRAM(S): at 20:58

## 2021-11-12 RX ADMIN — LAMOTRIGINE 150 MILLIGRAM(S): 25 TABLET, ORALLY DISINTEGRATING ORAL at 09:16

## 2021-11-12 RX ADMIN — Medication 2 MILLIGRAM(S): at 20:58

## 2021-11-12 RX ADMIN — LAMOTRIGINE 150 MILLIGRAM(S): 25 TABLET, ORALLY DISINTEGRATING ORAL at 20:59

## 2021-11-12 RX ADMIN — RISPERIDONE 2 MILLIGRAM(S): 4 TABLET ORAL at 09:16

## 2021-11-12 RX ADMIN — Medication 2 MILLIGRAM(S): at 09:16

## 2021-11-12 RX ADMIN — RISPERIDONE 3 MILLIGRAM(S): 4 TABLET ORAL at 20:58

## 2021-11-12 RX ADMIN — Medication 150 MILLIGRAM(S): at 09:15

## 2021-11-12 NOTE — BH TREATMENT PLAN - NSTXCOPEINTERRN_PSY_ALL_CORE
Assess curent coping skills  Assess readiness to learn  Encourage daily participation in daily psychoeducational groups and activities   Monitor medication compliance and response
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
## 2021-11-13 PROCEDURE — 99231 SBSQ HOSP IP/OBS SF/LOW 25: CPT

## 2021-11-13 RX ADMIN — Medication 2 MILLIGRAM(S): at 20:35

## 2021-11-13 RX ADMIN — LAMOTRIGINE 150 MILLIGRAM(S): 25 TABLET, ORALLY DISINTEGRATING ORAL at 20:35

## 2021-11-13 RX ADMIN — Medication 150 MILLIGRAM(S): at 08:21

## 2021-11-13 RX ADMIN — LAMOTRIGINE 150 MILLIGRAM(S): 25 TABLET, ORALLY DISINTEGRATING ORAL at 08:21

## 2021-11-13 RX ADMIN — Medication 2 MILLIGRAM(S): at 08:22

## 2021-11-13 RX ADMIN — RISPERIDONE 2 MILLIGRAM(S): 4 TABLET ORAL at 08:21

## 2021-11-13 RX ADMIN — RISPERIDONE 3 MILLIGRAM(S): 4 TABLET ORAL at 20:36

## 2021-11-13 RX ADMIN — Medication 150 MILLIGRAM(S): at 20:35
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
## 2021-11-13 NOTE — BH INPATIENT PSYCHIATRY PROGRESS NOTE - NSBHPSYCHOLCOGABN_PSY_A_CORE
disoriented to time/disoriented to place/disoriented to person
disoriented to time/disoriented to place/disoriented to person
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
## 2021-11-14 PROCEDURE — 99231 SBSQ HOSP IP/OBS SF/LOW 25: CPT

## 2021-11-14 RX ADMIN — LAMOTRIGINE 150 MILLIGRAM(S): 25 TABLET, ORALLY DISINTEGRATING ORAL at 08:56

## 2021-11-14 RX ADMIN — Medication 150 MILLIGRAM(S): at 21:24

## 2021-11-14 RX ADMIN — Medication 2 MILLIGRAM(S): at 08:56

## 2021-11-14 RX ADMIN — Medication 150 MILLIGRAM(S): at 08:56

## 2021-11-14 RX ADMIN — RISPERIDONE 3 MILLIGRAM(S): 4 TABLET ORAL at 21:24

## 2021-11-14 RX ADMIN — Medication 2 MILLIGRAM(S): at 21:23

## 2021-11-14 RX ADMIN — RISPERIDONE 2 MILLIGRAM(S): 4 TABLET ORAL at 08:55

## 2021-11-14 RX ADMIN — LAMOTRIGINE 150 MILLIGRAM(S): 25 TABLET, ORALLY DISINTEGRATING ORAL at 21:24
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
## 2021-11-15 LAB — CK SERPL-CCNC: 293 U/L — HIGH (ref 25–170)

## 2021-11-15 PROCEDURE — 99232 SBSQ HOSP IP/OBS MODERATE 35: CPT

## 2021-11-15 RX ORDER — RISPERIDONE 4 MG/1
3 TABLET ORAL DAILY
Refills: 0 | Status: DISCONTINUED | OUTPATIENT
Start: 2021-11-15 | End: 2021-12-03

## 2021-11-15 RX ADMIN — Medication 2 MILLIGRAM(S): at 08:31

## 2021-11-15 RX ADMIN — RISPERIDONE 3 MILLIGRAM(S): 4 TABLET ORAL at 21:02

## 2021-11-15 RX ADMIN — Medication 150 MILLIGRAM(S): at 21:02

## 2021-11-15 RX ADMIN — Medication 150 MILLIGRAM(S): at 08:31

## 2021-11-15 RX ADMIN — LAMOTRIGINE 150 MILLIGRAM(S): 25 TABLET, ORALLY DISINTEGRATING ORAL at 08:31

## 2021-11-15 RX ADMIN — Medication 2 MILLIGRAM(S): at 21:02

## 2021-11-15 RX ADMIN — LAMOTRIGINE 150 MILLIGRAM(S): 25 TABLET, ORALLY DISINTEGRATING ORAL at 21:02

## 2021-11-15 RX ADMIN — RISPERIDONE 2 MILLIGRAM(S): 4 TABLET ORAL at 08:31

## 2021-11-15 NOTE — BH INPATIENT PSYCHIATRY PROGRESS NOTE - CURRENT MEDICATION
MEDICATIONS  (STANDING):  lamoTRIgine 150 milliGRAM(s) Oral two times a day  risperiDONE   Tablet 2 milliGRAM(s) Oral daily  risperiDONE   Tablet 3 milliGRAM(s) Oral at bedtime  topiramate 150 milliGRAM(s) Oral two times a day  trihexyphenidyl 2 milliGRAM(s) Oral two times a day    MEDICATIONS  (PRN):  OLANZapine 5 milliGRAM(s) Oral every 6 hours PRN agitation  OLANZapine Injectable 5 milliGRAM(s) IntraMuscular Once PRN severe agitation   MEDICATIONS  (STANDING):  lamoTRIgine 150 milliGRAM(s) Oral two times a day  risperiDONE   Tablet 3 milliGRAM(s) Oral at bedtime  risperiDONE   Tablet 3 milliGRAM(s) Oral daily  topiramate 150 milliGRAM(s) Oral two times a day  trihexyphenidyl 2 milliGRAM(s) Oral two times a day    MEDICATIONS  (PRN):  OLANZapine 5 milliGRAM(s) Oral every 6 hours PRN agitation  OLANZapine Injectable 5 milliGRAM(s) IntraMuscular Once PRN severe agitation
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
## 2021-11-15 NOTE — BH INPATIENT PSYCHIATRY PROGRESS NOTE - OTHER
denies but appears internally preoccupied and smiles inappropriately denies but appears internally preoccupied and smiles inappropriately, seen speaking to herself
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
## 2021-11-16 PROCEDURE — 99231 SBSQ HOSP IP/OBS SF/LOW 25: CPT

## 2021-11-16 RX ADMIN — RISPERIDONE 3 MILLIGRAM(S): 4 TABLET ORAL at 20:45

## 2021-11-16 RX ADMIN — RISPERIDONE 3 MILLIGRAM(S): 4 TABLET ORAL at 08:14

## 2021-11-16 RX ADMIN — LAMOTRIGINE 150 MILLIGRAM(S): 25 TABLET, ORALLY DISINTEGRATING ORAL at 20:44

## 2021-11-16 RX ADMIN — Medication 2 MILLIGRAM(S): at 20:44

## 2021-11-16 RX ADMIN — Medication 150 MILLIGRAM(S): at 20:44

## 2021-11-16 RX ADMIN — Medication 150 MILLIGRAM(S): at 08:14

## 2021-11-16 RX ADMIN — Medication 2 MILLIGRAM(S): at 08:15

## 2021-11-16 RX ADMIN — LAMOTRIGINE 150 MILLIGRAM(S): 25 TABLET, ORALLY DISINTEGRATING ORAL at 08:15

## 2021-11-16 NOTE — BH INPATIENT PSYCHIATRY PROGRESS NOTE - OTHER
denies but appears internally preoccupied and smiles inappropriately, seen speaking to herself  TD of the trunk

## 2021-11-16 NOTE — BH INPATIENT PSYCHIATRY PROGRESS NOTE - NSBHFUPINTERVALHXFT_PSY_A_CORE
Patient is followed up for psychosis, delusions. Chart, medications and labs reviewed.  Patient is discussed with treatment team. No significant overnight issues. As per staff patient is adherent with medication regimen. States that she does not want to go back to her  and wants to find her boyfriend from 20 years ago, "We last saw each other at graduation."  Attempted to re-orient pt back to reality, but pt was not receptive to this. No SE reported or noted. Pt is childlike with almost inappropriately bright affect at times. Patient reports good sleep and appetite. Pt observed to isolate to her room and speak to herself when she does not think she is being observed. When asked about AH, pt initially stated "sounds" but when asked to elaborate, pt then denied A/VH. Staff is pushing fluids because of high CPK levels (down trending, 293 on 11/15/21). Will continue to monitor. Patient continues to state that she wants to go home to her brother in [place] and does not want to go back her . Patient Denies SIIP, HIIP, and A/V hallucinations. Will continue to monitor and offer therapeutic care. Pt noted to have TD of the trunk.

## 2021-11-16 NOTE — BH INPATIENT PSYCHIATRY PROGRESS NOTE - CURRENT MEDICATION
MEDICATIONS  (STANDING):  lamoTRIgine 150 milliGRAM(s) Oral two times a day  risperiDONE   Tablet 3 milliGRAM(s) Oral at bedtime  risperiDONE   Tablet 3 milliGRAM(s) Oral daily  topiramate 150 milliGRAM(s) Oral two times a day  trihexyphenidyl 2 milliGRAM(s) Oral two times a day    MEDICATIONS  (PRN):  OLANZapine 5 milliGRAM(s) Oral every 6 hours PRN agitation  OLANZapine Injectable 5 milliGRAM(s) IntraMuscular Once PRN severe agitation
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
## 2021-11-17 LAB
CK SERPL-CCNC: 130 U/L — SIGNIFICANT CHANGE UP (ref 25–170)
COVID-19 NUCLEOCAPSID GAM AB INTERP: POSITIVE
COVID-19 NUCLEOCAPSID TOTAL GAM ANTIBODY RESULT: 190 INDEX — HIGH
SARS-COV-2 IGG+IGM SERPL QL IA: 190 INDEX — HIGH
SARS-COV-2 IGG+IGM SERPL QL IA: POSITIVE

## 2021-11-17 PROCEDURE — 99231 SBSQ HOSP IP/OBS SF/LOW 25: CPT

## 2021-11-17 RX ADMIN — Medication 2 MILLIGRAM(S): at 08:38

## 2021-11-17 RX ADMIN — LAMOTRIGINE 150 MILLIGRAM(S): 25 TABLET, ORALLY DISINTEGRATING ORAL at 08:38

## 2021-11-17 RX ADMIN — RISPERIDONE 3 MILLIGRAM(S): 4 TABLET ORAL at 20:31

## 2021-11-17 RX ADMIN — Medication 2 MILLIGRAM(S): at 20:31

## 2021-11-17 RX ADMIN — Medication 150 MILLIGRAM(S): at 20:31

## 2021-11-17 RX ADMIN — RISPERIDONE 3 MILLIGRAM(S): 4 TABLET ORAL at 08:38

## 2021-11-17 RX ADMIN — LAMOTRIGINE 150 MILLIGRAM(S): 25 TABLET, ORALLY DISINTEGRATING ORAL at 20:31

## 2021-11-17 RX ADMIN — Medication 150 MILLIGRAM(S): at 08:38

## 2021-11-17 NOTE — BH INPATIENT PSYCHIATRY PROGRESS NOTE - OTHER
TD of the trunk  denies but appears internally preoccupied and smiles inappropriately, seen speaking to herself  TD of the trunk/head
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
## 2021-11-17 NOTE — BH INPATIENT PSYCHIATRY PROGRESS NOTE - NSBHMSEJUDGE_PSY_A_CORE
Telephone Encounter by Kassie Arizmendi at 10/01/18 09:54 AM     Author:  Kassie Arizmendi Service:  (none) Author Type:  Patient      Filed:  10/01/18 09:59 AM Encounter Date:  4/30/2018 Status:  Signed     :  Kassie Arizmendi (Patient )            Patient is scheduled for 1.30.2018 with Dr Alvarado close encounter[EJ1.1M]       Revision History        User Key Date/Time User Provider Type Action    > EJ1.1 10/01/18 09:59 AM Kassie Arizmendi Patient  Sign    M - Manual             Poor

## 2021-11-18 PROCEDURE — 99231 SBSQ HOSP IP/OBS SF/LOW 25: CPT

## 2021-11-18 RX ADMIN — RISPERIDONE 3 MILLIGRAM(S): 4 TABLET ORAL at 08:38

## 2021-11-18 RX ADMIN — Medication 150 MILLIGRAM(S): at 08:38

## 2021-11-18 RX ADMIN — LAMOTRIGINE 150 MILLIGRAM(S): 25 TABLET, ORALLY DISINTEGRATING ORAL at 08:38

## 2021-11-18 RX ADMIN — Medication 2 MILLIGRAM(S): at 08:38

## 2021-11-18 RX ADMIN — RISPERIDONE 3 MILLIGRAM(S): 4 TABLET ORAL at 21:30

## 2021-11-18 RX ADMIN — LAMOTRIGINE 150 MILLIGRAM(S): 25 TABLET, ORALLY DISINTEGRATING ORAL at 21:30

## 2021-11-18 RX ADMIN — Medication 2 MILLIGRAM(S): at 21:30

## 2021-11-18 RX ADMIN — Medication 150 MILLIGRAM(S): at 21:30

## 2021-11-18 NOTE — BH INPATIENT PSYCHIATRY PROGRESS NOTE - OTHER
TD of the trunk/head denies but appears internally preoccupied and smiles inappropriately, seen speaking to herself  impaired
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
## 2021-11-18 NOTE — BH INPATIENT PSYCHIATRY PROGRESS NOTE - NSBHASSESSSUMMFT_PSY_ALL_CORE
Ms. Francois is a 40yo woman, primarily Tamazight-speaking (writer speaks Tamazight, no  used), ,  no dependents, domiciled with , with PPHx of Intellectual disability, communication disorder, MDD, unspecified psychotic disorder, delusional disorder, and unspecified anxiety, with 4 inpatient admissions (last admission: [facility] in the setting of running away/unable to care for herself), reports no previous SAs/NSSB, denies any h/o violence/legal issues/substance use, with PMHX of migraines, seizures, dorsalgia, obesity, and h/o dislocation and sprain of joints and ligaments in the lumbar spine and pelvis, was [facility] to [facility] after patient was wondering around at a  police precinct.    Today, pt was childlike, now denying AH, but seen speaking and laughing to herself in her room, delusional about finding her BF from 20 years ago. Asking for d/c to family in [place]. Spoke to family (see above)    PLAN:   -TYLER SCHUSTER on 11/17/21  -c/w risperdal to 3 mg PO BID   -c/w lamictal 150 mg PO BID, topamax 150 mg PO BID, and artane 2 mg PO BID  -STAT UA and UTOX negative   -voluntary admission   -routine checks  -encourage group and milieu therapy  -continue to try and outreach collaterals Ms. Francois is a 42yo woman, primarily Romanian-speaking (writer speaks Romanian, no  used), ,  no dependents, domiciled with , with PPHx of Intellectual disability, communication disorder, MDD, unspecified psychotic disorder, delusional disorder, and unspecified anxiety, with 4 inpatient admissions (last admission: [facility] in the setting of running away/unable to care for herself), reports no previous SAs/NSSB, denies any h/o violence/legal issues/substance use, with PMHX of migraines, seizures, dorsalgia, obesity, and h/o dislocation and sprain of joints and ligaments in the lumbar spine and pelvis, [facility] at a  [facility] precinct.    Today, pt was childlike, now denying AH, but seen speaking to herself in her room, delusional about finding her BF from 20 years ago. Pt became tearful when informed her brother is unable to take the pt to [place]. Pt gave verbal consent to speak to cousin Juana to discuss discharge planning     PLAN:   -CPK WNL on 11/17/21  -c/w risperdal to 3 mg PO BID   -c/w lamictal 150 mg PO BID, topamax 150 mg PO BID, and artane 2 mg PO BID  -STAT UA and UTOX negative   -voluntary admission   -routine checks  -encourage group and milieu therapy  -continue to try and outreach collaterals

## 2021-11-19 PROCEDURE — 99231 SBSQ HOSP IP/OBS SF/LOW 25: CPT

## 2021-11-19 RX ADMIN — Medication 150 MILLIGRAM(S): at 09:20

## 2021-11-19 RX ADMIN — LAMOTRIGINE 150 MILLIGRAM(S): 25 TABLET, ORALLY DISINTEGRATING ORAL at 09:20

## 2021-11-19 RX ADMIN — RISPERIDONE 3 MILLIGRAM(S): 4 TABLET ORAL at 20:24

## 2021-11-19 RX ADMIN — RISPERIDONE 3 MILLIGRAM(S): 4 TABLET ORAL at 09:20

## 2021-11-19 RX ADMIN — Medication 2 MILLIGRAM(S): at 20:24

## 2021-11-19 RX ADMIN — Medication 150 MILLIGRAM(S): at 20:24

## 2021-11-19 RX ADMIN — LAMOTRIGINE 150 MILLIGRAM(S): 25 TABLET, ORALLY DISINTEGRATING ORAL at 20:24

## 2021-11-19 RX ADMIN — Medication 2 MILLIGRAM(S): at 09:20

## 2021-11-19 NOTE — BH INPATIENT PSYCHIATRY PROGRESS NOTE - NSBHFUPINTERVALHXFT_PSY_A_CORE
Patient is followed up for psychosis, delusions. Chart, medications and labs reviewed.  Patient is discussed with treatment team. No significant overnight issues. As per staff patient is adherent with medication regimen but irritable this am.  No SE reported or noted. States that she does not want to go back to her  and does not want to go home to cousin. States "my  lying to you." Patient states she wants to go to the "motorcycle gang in [place]." She states this is where her "boyfriend" is located (her boyfriend from high school that she has not seen since.) States there are t.v's there. Reality testing done with patient but patient is not receptive. States "the police knows where he is." States "I want to go to the police so that they can take me over there. Patient states my boyfriend lost contact with me because his parent broke us up. Again patient not receptive to reality testing. Patient then states "Im going to buy a house with the police." Reassurance and supports provided. Pt gave verbal consent to speak to her cousin Juana [phone], message left, awaiting call back.  Otherwise, patient reports good sleep and appetite. Pt observed to isolate to her room and speak to herself when she does not think she is being observed.  CPK levels WNL 11/17/21. Patient Denies SIIP, HIIP, and A/V hallucinations. Will continue to monitor and offer therapeutic care. Pt noted to have TD of the trunk/head.     Patient is followed up for psychosis, delusions. Chart, medications and labs reviewed.  Patient is discussed with treatment team. No significant overnight issues. As per staff patient is adherent with medication regimen but irritable this am.  No SE reported or noted. States that she does not want to go back to her  and does not want to go home to her cousin. States "my  lying to you." Patient states she wants to go to the "motorcycle gang in [place]." She states this is where her "boyfriend"  Blayne is located (her boyfriend from high school that she has not seen since). Pt states she lost contact with Blayne after high school and was not receptive to reality testing, "He's not . He's in [place]." States there are TVs there, "big ones." States "the police knows where he is." States "I want to go to the police so that they can take me over there" and states the police will get her a house. Patient states "my boyfriend lost contact with me because his parent broke us up." Reassurance and supports provided. Pt gave verbal consent to speak to her cousin Juana [phone], message left, awaiting call back.  Otherwise, patient reports good sleep and appetite. Pt observed to isolate to her room and speak to herself when she does not think she is being observed.  CPK levels WNL 11/17/21. Patient Denies SIIP, HIIP, and A/V hallucinations. Will continue to monitor and offer therapeutic care. Pt noted to have TD of the trunk/head.

## 2021-11-19 NOTE — BH INPATIENT PSYCHIATRY PROGRESS NOTE - OTHER
denies but appears internally preoccupied and smiles inappropriately, seen speaking to herself  impaired TD of the trunk/head

## 2021-11-19 NOTE — BH INPATIENT PSYCHIATRY PROGRESS NOTE - CURRENT MEDICATION
MEDICATIONS  (STANDING):  lamoTRIgine 150 milliGRAM(s) Oral two times a day  risperiDONE   Tablet 3 milliGRAM(s) Oral at bedtime  risperiDONE   Tablet 3 milliGRAM(s) Oral daily  topiramate 150 milliGRAM(s) Oral two times a day  trihexyphenidyl 2 milliGRAM(s) Oral two times a day    MEDICATIONS  (PRN):  OLANZapine 5 milliGRAM(s) Oral every 6 hours PRN agitation  OLANZapine Injectable 5 milliGRAM(s) IntraMuscular Once PRN severe agitation   MEDICATIONS  (STANDING):  lamoTRIgine 150 milliGRAM(s) Oral two times a day  risperiDONE   Tablet 3 milliGRAM(s) Oral daily  risperiDONE   Tablet 3 milliGRAM(s) Oral at bedtime  topiramate 150 milliGRAM(s) Oral two times a day  trihexyphenidyl 2 milliGRAM(s) Oral two times a day    MEDICATIONS  (PRN):  OLANZapine 5 milliGRAM(s) Oral every 6 hours PRN agitation  OLANZapine Injectable 5 milliGRAM(s) IntraMuscular Once PRN severe agitation

## 2021-11-19 NOTE — BH INPATIENT PSYCHIATRY PROGRESS NOTE - NSBHASSESSSUMMFT_PSY_ALL_CORE
Ms. Francois is a 42yo woman, primarily Greek-speaking (writer speaks Greek, no  used), ,  no dependents, domiciled with , with PPHx of Intellectual disability, communication disorder, MDD, unspecified psychotic disorder, delusional disorder, and unspecified anxiety, with 4 inpatient admissions (last admission: [facility] in the setting of running away/unable to care for herself), reports no previous SAs/NSSB, denies any h/o violence/legal issues/substance use, with PMHX of migraines, seizures, dorsalgia, obesity, and h/o dislocation and sprain of joints and ligaments in the lumbar spine and pelvis, [facility] at a  [facility] precinct.    Today, pt was childlike, now denying AH, but seen speaking to herself in her room, delusional about finding her BF from 20 years ago. Pt became tearful when informed her brother is unable to take the pt to [place]. Pt gave verbal consent to speak to cousin Juana to discuss discharge planning     PLAN:   -CPK WNL on 11/17/21  -c/w risperdal to 3 mg PO BID   -c/w lamictal 150 mg PO BID, topamax 150 mg PO BID, and artane 2 mg PO BID  -STAT UA and UTOX negative   -voluntary admission   -routine checks  -encourage group and milieu therapy  -continue to try and outreach collaterals Ms. Francois is a 42yo woman, primarily Kinyarwanda-speaking (writer speaks Kinyarwanda, no  used), ,  no dependents, domiciled with , with PPHx of Intellectual disability, communication disorder, MDD, unspecified psychotic disorder, delusional disorder, and unspecified anxiety, with 4 inpatient admissions (last admission: [facility] in the setting of running away/unable to care for herself), reports no previous SAs/NSSB, denies any h/o violence/legal issues/substance use, with PMHX of migraines, seizures, dorsalgia, obesity, and h/o dislocation and sprain of joints and ligaments in the lumbar spine and pelvis, was [facility] to [facility] after patient was wondering around at a  police precinct.    Today, pt was childlike, now denying AH, but seen speaking to herself in her room, delusional about finding her BF from 20 years ago. Pt gave verbal consent to speak to cousin Juana to discuss discharge planning - called awaiting call back    PLAN:   -TYLER WNL on 11/17/21  -c/w risperdal to 3 mg PO BID   -c/w lamictal 150 mg PO BID, topamax 150 mg PO BID, and artane 2 mg PO BID  -STAT UA and UTOX negative   -voluntary admission   -routine checks  -encourage group and milieu therapy  -continue to try and outreach family for discharge planning

## 2021-11-20 RX ADMIN — RISPERIDONE 3 MILLIGRAM(S): 4 TABLET ORAL at 08:48

## 2021-11-20 RX ADMIN — LAMOTRIGINE 150 MILLIGRAM(S): 25 TABLET, ORALLY DISINTEGRATING ORAL at 20:57

## 2021-11-20 RX ADMIN — Medication 150 MILLIGRAM(S): at 20:57

## 2021-11-20 RX ADMIN — RISPERIDONE 3 MILLIGRAM(S): 4 TABLET ORAL at 20:57

## 2021-11-20 RX ADMIN — Medication 150 MILLIGRAM(S): at 08:48

## 2021-11-20 RX ADMIN — Medication 2 MILLIGRAM(S): at 08:48

## 2021-11-20 RX ADMIN — Medication 2 MILLIGRAM(S): at 20:57

## 2021-11-20 RX ADMIN — LAMOTRIGINE 150 MILLIGRAM(S): 25 TABLET, ORALLY DISINTEGRATING ORAL at 08:48

## 2021-11-21 RX ADMIN — Medication 150 MILLIGRAM(S): at 20:26

## 2021-11-21 RX ADMIN — Medication 2 MILLIGRAM(S): at 20:25

## 2021-11-21 RX ADMIN — RISPERIDONE 3 MILLIGRAM(S): 4 TABLET ORAL at 08:44

## 2021-11-21 RX ADMIN — RISPERIDONE 3 MILLIGRAM(S): 4 TABLET ORAL at 20:25

## 2021-11-21 RX ADMIN — LAMOTRIGINE 150 MILLIGRAM(S): 25 TABLET, ORALLY DISINTEGRATING ORAL at 08:44

## 2021-11-21 RX ADMIN — LAMOTRIGINE 150 MILLIGRAM(S): 25 TABLET, ORALLY DISINTEGRATING ORAL at 20:25

## 2021-11-21 RX ADMIN — Medication 150 MILLIGRAM(S): at 08:44

## 2021-11-21 RX ADMIN — Medication 2 MILLIGRAM(S): at 08:44

## 2021-11-22 PROCEDURE — 99231 SBSQ HOSP IP/OBS SF/LOW 25: CPT

## 2021-11-22 RX ADMIN — Medication 150 MILLIGRAM(S): at 08:50

## 2021-11-22 RX ADMIN — RISPERIDONE 3 MILLIGRAM(S): 4 TABLET ORAL at 20:24

## 2021-11-22 RX ADMIN — Medication 2 MILLIGRAM(S): at 20:24

## 2021-11-22 RX ADMIN — Medication 150 MILLIGRAM(S): at 20:24

## 2021-11-22 RX ADMIN — LAMOTRIGINE 150 MILLIGRAM(S): 25 TABLET, ORALLY DISINTEGRATING ORAL at 08:50

## 2021-11-22 RX ADMIN — LAMOTRIGINE 150 MILLIGRAM(S): 25 TABLET, ORALLY DISINTEGRATING ORAL at 20:24

## 2021-11-22 RX ADMIN — Medication 2 MILLIGRAM(S): at 08:50

## 2021-11-22 RX ADMIN — RISPERIDONE 3 MILLIGRAM(S): 4 TABLET ORAL at 08:50

## 2021-11-22 NOTE — BH INPATIENT PSYCHIATRY PROGRESS NOTE - CURRENT MEDICATION
MEDICATIONS  (STANDING):  lamoTRIgine 150 milliGRAM(s) Oral two times a day  risperiDONE   Tablet 3 milliGRAM(s) Oral daily  risperiDONE   Tablet 3 milliGRAM(s) Oral at bedtime  topiramate 150 milliGRAM(s) Oral two times a day  trihexyphenidyl 2 milliGRAM(s) Oral two times a day    MEDICATIONS  (PRN):  OLANZapine 5 milliGRAM(s) Oral every 6 hours PRN agitation  OLANZapine Injectable 5 milliGRAM(s) IntraMuscular Once PRN severe agitation

## 2021-11-22 NOTE — BH INPATIENT PSYCHIATRY PROGRESS NOTE - OTHER
TD of the trunk/head impaired denies but appears internally preoccupied and smiles inappropriately, seen speaking to herself

## 2021-11-22 NOTE — BH INPATIENT PSYCHIATRY PROGRESS NOTE - NSBHASSESSSUMMFT_PSY_ALL_CORE
Ms. Francois is a 40yo woman, primarily Kazakh-speaking (writer speaks Kazakh, no  used), ,  no dependents, domiciled with , with PPHx of Intellectual disability, communication disorder, MDD, unspecified psychotic disorder, delusional disorder, and unspecified anxiety, with 4 inpatient admissions (last admission: [facility] 2020 in the setting of running away/unable to care for herself), reports no previous SAs/NSSB, denies any h/o violence/legal issues/substance use, with PMHX of migraines, seizures, dorsalgia, obesity, and h/o dislocation and sprain of joints and ligaments in the lumbar spine and pelvis, was [facility] to [facility] after patient was wondering around at a  police precinct.    Today, pt was childlike, now denying AH, but seen speaking to herself in her room. Pt gave verbal consent to speak to cousin Juana to discuss discharge planning - see above    PLAN:   -CPK WNL on 11/17/21  -c/w risperdal to 3 mg PO BID   -c/w lamictal 150 mg PO BID, topamax 150 mg PO BID, and artane 2 mg PO BID  -STAT UA and UTOX negative   -voluntary admission   -routine checks  -encourage group and milieu therapy  -continue to try and outreach family for discharge planning

## 2021-11-22 NOTE — BH INPATIENT PSYCHIATRY PROGRESS NOTE - NSBHFUPINTERVALHXFT_PSY_A_CORE
Patient is followed up for psychosis, delusions. Chart, medications and labs reviewed.  Patient is discussed with treatment team. No significant overnight issues; staff reports pt remains self isolative to room and observed to speak to herself. As per staff patient is adherent with medication regimen but irritable this am.  No SE reported or noted. Pt initially stated that her brother Patrick wanted her to come for "Tur[place] Day" but eventually stated that she did not talk to her brother over the weekend. Pt then stated that her  Ravi' aunt was going to "kill me" but could not state why she would do that, "it in my mind." Pt does endorse +AH of "angels, my grandmother" telling her she loves her. Pt denies these are command in nature. Pt initially stated she did not want to go back to Ravi, but then later in the conversation retracted this. Pt gave verbal consent to speak to her cousin Juana [phone]. Juana states that she was not aware of any abuse by Ravi and that he is very loving and caring towards the pt. Juana is not aware of any aunt of Ravi that may want to hurt her. Juana advocates for the pt to return to [facility] as there are no other family that are able to take the pt in. CPK levels WNL 11/17/21. Patient Denies SIIP, HIIP, and A/V hallucinations. Will continue to monitor and offer therapeutic care. Pt noted to have TD of the trunk/head.

## 2021-11-23 RX ADMIN — LAMOTRIGINE 150 MILLIGRAM(S): 25 TABLET, ORALLY DISINTEGRATING ORAL at 08:26

## 2021-11-23 RX ADMIN — Medication 2 MILLIGRAM(S): at 08:26

## 2021-11-23 RX ADMIN — RISPERIDONE 3 MILLIGRAM(S): 4 TABLET ORAL at 20:55

## 2021-11-23 RX ADMIN — LAMOTRIGINE 150 MILLIGRAM(S): 25 TABLET, ORALLY DISINTEGRATING ORAL at 20:55

## 2021-11-23 RX ADMIN — Medication 2 MILLIGRAM(S): at 20:55

## 2021-11-23 RX ADMIN — RISPERIDONE 3 MILLIGRAM(S): 4 TABLET ORAL at 08:27

## 2021-11-23 RX ADMIN — Medication 150 MILLIGRAM(S): at 08:26

## 2021-11-23 RX ADMIN — Medication 150 MILLIGRAM(S): at 20:55

## 2021-11-23 NOTE — BH INPATIENT PSYCHIATRY PROGRESS NOTE - NSBHASSESSSUMMFT_PSY_ALL_CORE
Ms. Francois is a 42yo woman, primarily Cuban-speaking (writer speaks Cuban, no  used), ,  no dependents, domiciled with , with PPHx of Intellectual disability, communication disorder, MDD, unspecified psychotic disorder, delusional disorder, and unspecified anxiety, with 4 inpatient admissions (last admission: [facility] in the setting of running away/unable to care for herself), reports no previous SAs/NSSB, denies any h/o violence/legal issues/substance use, with PMHX of migraines, seizures, dorsalgia, obesity, and h/o dislocation and sprain of joints and ligaments in the lumbar spine and pelvis, was [facility] to [facility] after patient was wondering around at a  police precinct.    Today, pt was childlike, now denying AH, but seen speaking to herself in her room. Pt now states she wants to speak with Ravi re: discharge planning     PLAN:   -TYLER WNL on 11/17/21  -c/w risperdal to 3 mg PO BID   -c/w lamictal 150 mg PO BID, topamax 150 mg PO BID, and artane 2 mg PO BID  -STAT UA and UTOX negative   -voluntary admission   -routine checks  -encourage group and milieu therapy  -continue to try and outreach family for discharge planning

## 2021-11-23 NOTE — BH INPATIENT PSYCHIATRY PROGRESS NOTE - NSBHFUPINTERVALHXFT_PSY_A_CORE
Patient is followed up for psychosis, delusions. Chart, medications and labs reviewed.  Patient is discussed with treatment team. No significant overnight issues; staff reports pt remains self isolative to room and observed to speak to herself. As per staff patient is adherent with medication regimen.  No SE reported or noted. Pt needs encouragement to perform ADLs and launder her clothes. Pt denies SIIP, HIIP, or A/VH today, but pt continues to endorse feeling Ravi' aunt will hurt her, but now states that the aunt is out of state and she does not see her. Pt now stating that she wants to speak to Ravi and given his #. CPK levels WNL 11/17/21. Will continue to monitor and offer therapeutic care. Pt noted to have TD of the trunk/head.

## 2021-11-23 NOTE — BH INPATIENT PSYCHIATRY PROGRESS NOTE - OTHER
denies but appears internally preoccupied and smiles inappropriately, seen speaking to herself  TD of the trunk/head impaired

## 2021-11-24 PROCEDURE — 99231 SBSQ HOSP IP/OBS SF/LOW 25: CPT

## 2021-11-24 RX ADMIN — Medication 150 MILLIGRAM(S): at 20:27

## 2021-11-24 RX ADMIN — LAMOTRIGINE 150 MILLIGRAM(S): 25 TABLET, ORALLY DISINTEGRATING ORAL at 20:28

## 2021-11-24 RX ADMIN — RISPERIDONE 3 MILLIGRAM(S): 4 TABLET ORAL at 20:27

## 2021-11-24 RX ADMIN — RISPERIDONE 3 MILLIGRAM(S): 4 TABLET ORAL at 08:49

## 2021-11-24 RX ADMIN — Medication 2 MILLIGRAM(S): at 20:27

## 2021-11-24 RX ADMIN — Medication 150 MILLIGRAM(S): at 08:49

## 2021-11-24 RX ADMIN — Medication 2 MILLIGRAM(S): at 08:49

## 2021-11-24 RX ADMIN — LAMOTRIGINE 150 MILLIGRAM(S): 25 TABLET, ORALLY DISINTEGRATING ORAL at 08:49

## 2021-11-24 NOTE — BH INPATIENT PSYCHIATRY PROGRESS NOTE - NSBHASSESSSUMMFT_PSY_ALL_CORE
Ms. Francois is a 42yo woman, primarily Cay[place] Islander-speaking (writer speaks [place] Islander, no  used), ,  no dependents, domiciled with , with PPHx of Intellectual disability, communication disorder, MDD, unspecified psychotic disorder, delusional disorder, and unspecified anxiety, with 4 inpatient admissions (last admission: [facility] in the setting of running away/unable to care for herself), reports no previous SAs/NSSB, denies any h/o violence/legal issues/substance use, with PMHX of migraines, seizures, dorsalgia, obesity, and h/o dislocation and sprain of joints and ligaments in the lumbar spine and pelvis, was [facility] to [facility] after patient was wondering around at a  police precinct.    Today, pt was childlike, now denying AH, but seen speaking to herself in her room. Pt now states she wants to speak with Ravi re: discharge planning, but remains suspicious of him and believes his aunt will hurt her     PLAN:   -CPK WNL on 11/17/21  -c/w risperdal to 3 mg PO BID   -c/w lamictal 150 mg PO BID, topamax 150 mg PO BID, and artane 2 mg PO BID  -STAT UA and UTOX negative   -voluntary admission   -routine checks  -encourage group and milieu therapy  -continue to try and outreach family for discharge planning

## 2021-11-24 NOTE — BH TREATMENT PLAN - NSTXPLANTHERAPYSESSIONSFT_PSY_ALL_CORE
11-18-21  Type of therapy: Coping skills,Leisure development,pt reporting she attends groups, however unable to remember when or which groups  Type of session: Individual  Level of patient participation: Quiet,Resistance to participation  Duration of participation: 15 minutes  Therapy conducted by: Psych rehab  Therapy Summary: Writer met with pt to discuss and assess progress towards specified psychiatric rehabilitation goals this week. On approch, pt was sitting up in a chair in her personal room, staring at the wall across from her. Pt's affect was blunted and bizarre. Pt seems to have limited insight and judegement into self, symptoms, and illness. Pt repeating to writer "I want to go to [facility]." When writer asked pt why [place], pt reported, "because my boyfriend is there" although per pt's chart, pt is  and does not seem to have family in [place]. Pt seems to have difficulty engaging meaningfully with others. Pt's social ability is limited, however pt somewhat making an effort to engage by answering write's questions with short phrases or "yes or no" answers. Pt slightly smiled towards end of interaction when writer shared that it was "nice meeting with you." This seemed most related. Pt reported that she is eating and sleeping well. Pt's ADL's appear diminished. Writer encouraged pt to tend to ADL's on a daily basis. Pt continues to have challenges in identifying coping skills. Pt was unable to identify coping skills. Writer encouraged pt utilize coping skills for better symptom management. Pt minimizing of symptoms. Pt reporting that she is going to groups, however couldn't remember which group she went to. Will continue to support pt towards goal progress. Pt denies negative thought process, SI/Hi/I/P as well as AH/VH/TH.  
  11-18-21  Type of therapy: Coping skills,Leisure development,pt reporting she attends groups, however unable to remember when or which groups  Type of session: Individual  Level of patient participation: Quiet,Resistance to participation  Duration of participation: 15 minutes  Therapy conducted by: Psych rehab  Therapy Summary: Writer met with pt to discuss and assess progress towards specified psychiatric rehabilitation goals this week. On approch, pt was sitting up in a chair in her personal room, staring at the wall across from her. Pt's affect was blunted and bizarre. Pt seems to have limited insight and judegement into self, symptoms, and illness. Pt repeating to writer "I want to go to [facility]." When writer asked pt why CT, pt reported, "because my boyfriend is there" although per pt's chart, pt is  and does not seem to have family in [place]. Pt seems to have difficulty engaging meaningfully with others. Pt's social ability is limited, however pt somewhat making an effort to engage by answering write's questions with short phrases or "yes or no" answers. Pt slightly smiled towards end of interaction when writer shared that it was "nice meeting with you." This seemed most related. Pt reported that she is eating and sleeping well. Pt's ADL's appear diminished. Writer encouraged pt to tend to ADL's on a daily basis. Pt continues to have challenges in identifying coping skills. Pt was unable to identify coping skills. Writer encouraged pt utilize coping skills for better symptom management. Pt minimizing of symptoms. Pt reporting that she is going to groups, however couldn't remember which group she went to. Will continue to support pt towards goal progress. Pt denies negative thought process, SI/Hi/I/P as well as AH/VH/TH.

## 2021-11-24 NOTE — BH INPATIENT PSYCHIATRY PROGRESS NOTE - NSBHFUPINTERVALHXFT_PSY_A_CORE
Patient is followed up for psychosis, delusions. Chart, medications and labs reviewed.  Patient is discussed with treatment team. No significant overnight issues; staff reports pt remains self isolative to room and observed to speak to herself. As per staff patient is adherent with medication regimen.  No SE reported or noted. Pt needs encouragement to perform ADLs and launder her clothes. Pt denies SIIP, HIIP, or A/VH today, but pt continues to endorse feeling Ravi' aunt will hurt her, but now states that the aunt is out of state and she does not see her. Pt now stating that she wants to speak to Ravi and given his # again. Pt continues to be suspicious of Ravi. Pt endorses +AH of "angels" asking how she's doing, but then denied this later in the conversation. CPK levels WNL 11/17/21. Will continue to monitor and offer therapeutic care. Pt noted to have TD of the trunk/head.

## 2021-11-24 NOTE — BH TREATMENT PLAN - NSTXDISORGINTERPR_PSY_ALL_CORE
Pt continues to have challenges in identifying coping skills. Pt was unable to identify coping skills. Writer encouraged pt utilize coping skills for better symptom management. Pt minimizing of symptoms. Pt reporting that she is going to groups, however couldn't remember which group she went to. Will continue to support pt towards goal progress.
During this hospitalization, patient will be encouraged to attend and participate in daily symptom management groups in order to identify two coping skills that assist in organizing thoughts within seven days.
Pt continues to have challenges in identifying coping skills. Pt was unable to identify coping skills. Writer encouraged pt utilize coping skills for better symptom management. Pt minimizing of symptoms. Pt reporting that she is going to groups, however couldn't remember which group she went to. Will continue to support pt towards goal progress.

## 2021-11-24 NOTE — BH INPATIENT PSYCHIATRY PROGRESS NOTE - OTHER
impaired TD of the trunk/head denies but appears internally preoccupied and smiles inappropriately, seen speaking to herself

## 2021-11-25 RX ADMIN — Medication 150 MILLIGRAM(S): at 09:24

## 2021-11-25 RX ADMIN — RISPERIDONE 3 MILLIGRAM(S): 4 TABLET ORAL at 09:24

## 2021-11-25 RX ADMIN — LAMOTRIGINE 150 MILLIGRAM(S): 25 TABLET, ORALLY DISINTEGRATING ORAL at 09:24

## 2021-11-25 RX ADMIN — RISPERIDONE 3 MILLIGRAM(S): 4 TABLET ORAL at 20:57

## 2021-11-25 RX ADMIN — Medication 150 MILLIGRAM(S): at 20:57

## 2021-11-25 RX ADMIN — LAMOTRIGINE 150 MILLIGRAM(S): 25 TABLET, ORALLY DISINTEGRATING ORAL at 20:57

## 2021-11-25 RX ADMIN — Medication 2 MILLIGRAM(S): at 09:24

## 2021-11-25 RX ADMIN — Medication 2 MILLIGRAM(S): at 20:57

## 2021-11-26 PROCEDURE — 99232 SBSQ HOSP IP/OBS MODERATE 35: CPT

## 2021-11-26 RX ADMIN — LAMOTRIGINE 150 MILLIGRAM(S): 25 TABLET, ORALLY DISINTEGRATING ORAL at 21:06

## 2021-11-26 RX ADMIN — LAMOTRIGINE 150 MILLIGRAM(S): 25 TABLET, ORALLY DISINTEGRATING ORAL at 08:30

## 2021-11-26 RX ADMIN — RISPERIDONE 3 MILLIGRAM(S): 4 TABLET ORAL at 21:06

## 2021-11-26 RX ADMIN — Medication 150 MILLIGRAM(S): at 08:52

## 2021-11-26 RX ADMIN — Medication 2 MILLIGRAM(S): at 21:06

## 2021-11-26 RX ADMIN — RISPERIDONE 3 MILLIGRAM(S): 4 TABLET ORAL at 08:30

## 2021-11-26 RX ADMIN — Medication 150 MILLIGRAM(S): at 21:06

## 2021-11-26 RX ADMIN — Medication 2 MILLIGRAM(S): at 08:30

## 2021-11-26 NOTE — BH INPATIENT PSYCHIATRY PROGRESS NOTE - NSBHFUPINTERVALHXFT_PSY_A_CORE
Chart, medications and labs reviewed. Case is discussed with treatment team. No significant overnight issues. Patient reported " I am afraid of Ravi". Patient observed talking to herself in her room. Patient is adherent with medication regimen.  No SE reported or noted. Pt needs encouragement to perform ADLs.. Pt noted to have TD of the trunk/head

## 2021-11-26 NOTE — BH INPATIENT PSYCHIATRY PROGRESS NOTE - NSBHASSESSSUMMFT_PSY_ALL_CORE
Ms. Francois is a 42yo woman, primarily Thai-speaking (writer speaks Thai, no  used), ,  no dependents, domiciled with , with PPHx of Intellectual disability, communication disorder, MDD, unspecified psychotic disorder, delusional disorder, and unspecified anxiety, with 4 inpatient admissions (last admission: [facility] in the setting of running away/unable to care for herself), reports no previous SAs/NSSB, denies any h/o violence/legal issues/substance use, with PMHX of migraines, seizures, dorsalgia, obesity, and h/o dislocation and sprain of joints and ligaments in the lumbar spine and pelvis, was BIBEMS to [facility] after patient was wondering around at a  police precinct.      Today, pt is psychotic and disorganized       PLAN:    -c/w risperdal 3 mg PO BID    -c/w lamictal 150 mg PO BID,   -c/w topamax 150 mg PO BID,   -c/w artane 2 mg PO BID   -encourage group and milieu therapy   -continue to try and outreach family for discharge planning

## 2021-11-26 NOTE — BH INPATIENT PSYCHIATRY PROGRESS NOTE - NSBHMSESPABN_PSY_A_CORE
Problem: Falls - Risk of  Goal: *Absence of Falls  Document Noemi Fall Risk and appropriate interventions in the flowsheet.    Outcome: Progressing Towards Goal  Fall Risk Interventions:  Mobility Interventions: Utilize walker, cane, or other assitive device, PT Consult for assist device competence, PT Consult for mobility concerns, Patient to call before getting OOB, OT consult for ADLs    Mentation Interventions: Adequate sleep, hydration, pain control    Medication Interventions: Assess postural VS orthostatic hypotension, Patient to call before getting OOB, Teach patient to arise slowly    Elimination Interventions: Call light in reach, Elevated toilet seat, Toileting schedule/hourly rounds Increased latency/Impaired articulation Impaired articulation

## 2021-11-26 NOTE — BH INPATIENT PSYCHIATRY PROGRESS NOTE - NSBHPSYCHOLCOGORIENT_PSY_A_CORE
Not fully oriented...
Unable to assess
Unable to assess
Not fully oriented...
Oriented to time, place, person, situation

## 2021-11-26 NOTE — BH INPATIENT PSYCHIATRY PROGRESS NOTE - OTHER
impaired denies but appears internally preoccupied and smiles inappropriately, seen speaking to herself  TD of the trunk/head

## 2021-11-27 RX ADMIN — LAMOTRIGINE 150 MILLIGRAM(S): 25 TABLET, ORALLY DISINTEGRATING ORAL at 20:28

## 2021-11-27 RX ADMIN — LAMOTRIGINE 150 MILLIGRAM(S): 25 TABLET, ORALLY DISINTEGRATING ORAL at 08:15

## 2021-11-27 RX ADMIN — Medication 2 MILLIGRAM(S): at 20:29

## 2021-11-27 RX ADMIN — Medication 2 MILLIGRAM(S): at 08:15

## 2021-11-27 RX ADMIN — Medication 150 MILLIGRAM(S): at 08:15

## 2021-11-27 RX ADMIN — Medication 150 MILLIGRAM(S): at 20:28

## 2021-11-27 RX ADMIN — RISPERIDONE 3 MILLIGRAM(S): 4 TABLET ORAL at 08:15

## 2021-11-27 RX ADMIN — RISPERIDONE 3 MILLIGRAM(S): 4 TABLET ORAL at 20:29

## 2021-11-28 RX ADMIN — Medication 150 MILLIGRAM(S): at 20:18

## 2021-11-28 RX ADMIN — RISPERIDONE 3 MILLIGRAM(S): 4 TABLET ORAL at 20:18

## 2021-11-28 RX ADMIN — Medication 2 MILLIGRAM(S): at 08:32

## 2021-11-28 RX ADMIN — RISPERIDONE 3 MILLIGRAM(S): 4 TABLET ORAL at 08:32

## 2021-11-28 RX ADMIN — Medication 2 MILLIGRAM(S): at 20:18

## 2021-11-28 RX ADMIN — LAMOTRIGINE 150 MILLIGRAM(S): 25 TABLET, ORALLY DISINTEGRATING ORAL at 08:32

## 2021-11-28 RX ADMIN — Medication 150 MILLIGRAM(S): at 08:32

## 2021-11-28 RX ADMIN — LAMOTRIGINE 150 MILLIGRAM(S): 25 TABLET, ORALLY DISINTEGRATING ORAL at 20:18

## 2021-11-29 PROCEDURE — 99231 SBSQ HOSP IP/OBS SF/LOW 25: CPT

## 2021-11-29 RX ADMIN — Medication 2 MILLIGRAM(S): at 20:56

## 2021-11-29 RX ADMIN — RISPERIDONE 3 MILLIGRAM(S): 4 TABLET ORAL at 20:56

## 2021-11-29 RX ADMIN — Medication 150 MILLIGRAM(S): at 20:56

## 2021-11-29 RX ADMIN — RISPERIDONE 3 MILLIGRAM(S): 4 TABLET ORAL at 08:34

## 2021-11-29 RX ADMIN — LAMOTRIGINE 150 MILLIGRAM(S): 25 TABLET, ORALLY DISINTEGRATING ORAL at 08:33

## 2021-11-29 RX ADMIN — Medication 2 MILLIGRAM(S): at 08:33

## 2021-11-29 RX ADMIN — Medication 150 MILLIGRAM(S): at 08:34

## 2021-11-29 RX ADMIN — LAMOTRIGINE 150 MILLIGRAM(S): 25 TABLET, ORALLY DISINTEGRATING ORAL at 20:56
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
## 2021-11-30 PROCEDURE — 99231 SBSQ HOSP IP/OBS SF/LOW 25: CPT

## 2021-11-30 RX ADMIN — Medication 2 MILLIGRAM(S): at 08:43

## 2021-11-30 RX ADMIN — RISPERIDONE 3 MILLIGRAM(S): 4 TABLET ORAL at 08:43

## 2021-11-30 RX ADMIN — Medication 150 MILLIGRAM(S): at 20:43

## 2021-11-30 RX ADMIN — Medication 150 MILLIGRAM(S): at 08:43

## 2021-11-30 RX ADMIN — Medication 2 MILLIGRAM(S): at 20:44

## 2021-11-30 RX ADMIN — LAMOTRIGINE 150 MILLIGRAM(S): 25 TABLET, ORALLY DISINTEGRATING ORAL at 08:43

## 2021-11-30 RX ADMIN — RISPERIDONE 3 MILLIGRAM(S): 4 TABLET ORAL at 20:43

## 2021-11-30 RX ADMIN — LAMOTRIGINE 150 MILLIGRAM(S): 25 TABLET, ORALLY DISINTEGRATING ORAL at 20:43
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
## 2021-11-30 NOTE — BH INPATIENT PSYCHIATRY PROGRESS NOTE - NSBHASSESSSUMMFT_PSY_ALL_CORE
Ms. Francois is a 40yo woman, primarily Albanian-speaking (writer speaks Albanian, no  used), ,  no dependents, domiciled with , with PPHx of Intellectual disability, communication disorder, MDD, unspecified psychotic disorder, delusional disorder, and unspecified anxiety, with 4 inpatient admissions (last admission: [facility] 2020 in the setting of running away/unable to care for herself), reports no previous SAs/NSSB, denies any h/o violence/legal issues/substance use, with PMHX of migraines, seizures, dorsalgia, obesity, and h/o dislocation and sprain of joints and ligaments in the lumbar spine and pelvis, was BIBEMS to [facility] after patient was wondering around at a  police precinct.    Today, pt was childlike, now denying AH, denying feeling unsafe with  and asking to return to . Pt initially refusing PROS/ACCESS-VR referrals     PLAN:   -TYLER SCHUSTER on 11/17/21  -c/w risperdal to 3 mg PO BID   -c/w lamictal 150 mg PO BID, topamax 150 mg PO BID, and artane 2 mg PO BID  -STAT UA and UTOX negative   -voluntary admission   -routine checks  -encourage group and milieu therapy  -discharge planning ongoing

## 2021-12-01 PROCEDURE — 99231 SBSQ HOSP IP/OBS SF/LOW 25: CPT

## 2021-12-01 RX ADMIN — LAMOTRIGINE 150 MILLIGRAM(S): 25 TABLET, ORALLY DISINTEGRATING ORAL at 20:20

## 2021-12-01 RX ADMIN — Medication 150 MILLIGRAM(S): at 20:20

## 2021-12-01 RX ADMIN — Medication 2 MILLIGRAM(S): at 07:52

## 2021-12-01 RX ADMIN — RISPERIDONE 3 MILLIGRAM(S): 4 TABLET ORAL at 20:20

## 2021-12-01 RX ADMIN — RISPERIDONE 3 MILLIGRAM(S): 4 TABLET ORAL at 07:52

## 2021-12-01 RX ADMIN — Medication 2 MILLIGRAM(S): at 20:20

## 2021-12-01 RX ADMIN — LAMOTRIGINE 150 MILLIGRAM(S): 25 TABLET, ORALLY DISINTEGRATING ORAL at 07:52

## 2021-12-01 RX ADMIN — Medication 150 MILLIGRAM(S): at 07:52

## 2021-12-01 NOTE — BH INPATIENT PSYCHIATRY PROGRESS NOTE - NSBHFUPINTERVALHXFT_PSY_A_CORE
Patient is followed up for psychosis, delusions. Chart, medications and labs reviewed.  Patient is discussed with treatment team. No significant overnight issues; staff reports pt remains self isolative to room and needs prompting to perform ADLs at times. As per staff patient is adherent with medication regimen. Pt denies SIIP, HIIP, or A/VH today. Pt states that the voice of her grandmother and "angels" have stopped, but also appears to be internally preoccupied at times. Pt is discharge focused and repeats "I go home to Ravi." Pt now denies feeling afraid of Ravi and states "He loves me." Pt again declined PROS or other additional therapies as an oupt. Pt states she only wants a medication provider; SW made aware and arrangements for referrals being made. CPK levels WNL 11/17/21. Will continue to monitor and offer therapeutic care. Pt noted to have TD of the trunk/head. Projected discharge this week.
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
## 2021-12-02 PROCEDURE — 99231 SBSQ HOSP IP/OBS SF/LOW 25: CPT

## 2021-12-02 RX ADMIN — Medication 2 MILLIGRAM(S): at 08:14

## 2021-12-02 RX ADMIN — LAMOTRIGINE 150 MILLIGRAM(S): 25 TABLET, ORALLY DISINTEGRATING ORAL at 21:18

## 2021-12-02 RX ADMIN — RISPERIDONE 3 MILLIGRAM(S): 4 TABLET ORAL at 08:15

## 2021-12-02 RX ADMIN — RISPERIDONE 3 MILLIGRAM(S): 4 TABLET ORAL at 21:18

## 2021-12-02 RX ADMIN — Medication 150 MILLIGRAM(S): at 21:19

## 2021-12-02 RX ADMIN — Medication 150 MILLIGRAM(S): at 08:15

## 2021-12-02 RX ADMIN — LAMOTRIGINE 150 MILLIGRAM(S): 25 TABLET, ORALLY DISINTEGRATING ORAL at 08:14

## 2021-12-02 RX ADMIN — Medication 2 MILLIGRAM(S): at 21:19

## 2021-12-02 NOTE — BH TREATMENT PLAN - NSTXIMPULSGOAL_PSY_ALL_CORE
Will be able to demonstrate the ability to pause before acting out negatively

## 2021-12-02 NOTE — BH SCALES AND SCREENS - NSBPRSSECTIONLABEL_PSY_ALL_CORE
.
[Well Developed] : well developed
[Well Nourished] : well nourished
[Normal Mental Status] : the patient's orientation, memory, attention, language and fund of knowledge were normal
[Appropriate] : appropriate
[No Respiratory Distress] : no respiratory distress 
[Clear to Auscultation] : lungs were clear to auscultation bilaterally
.
[Normal Rate] : normal rate 
[Impaired judgment] : intact judgment
[Impaired Insight] : intact insight
[de-identified] : Left knee swollen, painful medial.  Able to flex and extend but painful. In wheelchair, not weight bearing.  No calf tenderness or swelling.

## 2021-12-02 NOTE — BH TREATMENT PLAN - NSTXPATIENTPARTICIPATE_PSY_ALL_CORE
Patient participated in identification of needs/problems/goals for treatment

## 2021-12-02 NOTE — BH TREATMENT PLAN - NSDCCRITERIA_PSY_ALL_CORE
CGI <=2, return to baseline fucntioning as evidenced by behavioral control, staff observations, pt self report 
Cerebrovascular accident (CVA), unspecified mechanism
CGI <=2, return to baseline fucntioning as evidenced by behavioral control, staff observations, pt self report

## 2021-12-02 NOTE — BH SCALES AND SCREENS - NSBPRSCONCDISORG_PSY_ALL_CORE
3 = Mild - e.g., frequently vague, but the interview is able to progress smoothly
6 = Severe - formal thought disorder is present for most of the interview, and the interview is severely strained

## 2021-12-02 NOTE — BH TREATMENT PLAN - NSTXDISORGGOAL_PSY_ALL_CORE
Will make at least 3 goal and reality oriented statements during therapy
Will identify 2 coping skills that assist in organizing

## 2021-12-02 NOTE — BH SCALES AND SCREENS - NSBPRSMANNPOST_PSY_ALL_CORE
3 = Mild – strange behavior but not obviously bizarre, e.g., infrequent head-tilting (side to side) in a rhythmic fashion, intermittent abnormal finger movements
1 = Not Observed (Not present)
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
## 2021-12-02 NOTE — BH SCALES AND SCREENS - NSBPRSSUSPIC_PSY_ALL_CORE
3 = Mild – occasional instances of suspiciousness that are definitely not warranted by the situation
4 = Moderate – more frequent suspiciousness, or transient ideas of reference

## 2021-12-02 NOTE — BH INPATIENT PSYCHIATRY PROGRESS NOTE - NSBHFUPINTERVALHXFT_PSY_A_CORE
Patient is followed up for psychosis, delusions. Chart, medications and labs reviewed.  Patient is discussed with treatment team. No significant overnight issues; staff reports pt has been more visible on the unit and attended groups though she did not verbally participate. As per staff patient is adherent with medication regimen. Pt denies SIIP, HIIP, or A/VH today. Pt states that the voice of her grandmother and "angels" have stopped, but that her grandmother watches over her while she sleeps. Pt now denies feeling afraid of Ravi and states she wants to be discharged home to him. SW working on getting outpt referral for pt. CPK levels WNL 11/17/21. Will continue to monitor and offer therapeutic care. Pt noted to have TD of the trunk/head. Projected discharge this week.

## 2021-12-02 NOTE — BH TREATMENT PLAN - NSTXDCOPLKINTERSW_PSY_ALL_CORE
Ongoing support, psychoed and encouragement provided in effort to comply with meds, tx and discharge plans.
Support and psychoed to be provided and d/c plan to be arranged.
Ongoing support, psychoed and encouragement provided in effort to comply with meds, tx and discharge plans.
Ongoing support, psychoed and encouragement provided in effort to comply with meds, tx and discharge plans.

## 2021-12-02 NOTE — BH TREATMENT PLAN - NSTXPSYCHOGOAL_PSY_ALL_CORE
Will be able to report experiencing hallucinations to staff

## 2021-12-03 VITALS — TEMPERATURE: 98 F

## 2021-12-03 PROCEDURE — 99238 HOSP IP/OBS DSCHRG MGMT 30/<: CPT

## 2021-12-03 RX ORDER — TOPIRAMATE 25 MG
3 TABLET ORAL
Qty: 84 | Refills: 0
Start: 2021-12-03 | End: 2021-12-16

## 2021-12-03 RX ORDER — LAMOTRIGINE 25 MG/1
1 TABLET, ORALLY DISINTEGRATING ORAL
Qty: 28 | Refills: 0
Start: 2021-12-03 | End: 2021-12-16

## 2021-12-03 RX ORDER — RISPERIDONE 4 MG/1
1 TABLET ORAL
Qty: 28 | Refills: 0
Start: 2021-12-03 | End: 2021-12-16

## 2021-12-03 RX ORDER — TRIHEXYPHENIDYL HCL 2 MG
1 TABLET ORAL
Qty: 28 | Refills: 0
Start: 2021-12-03 | End: 2021-12-16

## 2021-12-03 RX ADMIN — Medication 150 MILLIGRAM(S): at 08:18

## 2021-12-03 RX ADMIN — RISPERIDONE 3 MILLIGRAM(S): 4 TABLET ORAL at 08:19

## 2021-12-03 RX ADMIN — LAMOTRIGINE 150 MILLIGRAM(S): 25 TABLET, ORALLY DISINTEGRATING ORAL at 08:18

## 2021-12-03 RX ADMIN — Medication 2 MILLIGRAM(S): at 08:14

## 2021-12-03 NOTE — BH INPATIENT PSYCHIATRY PROGRESS NOTE - NSBHMSEBODY_PSY_A_CORE
Overweight

## 2021-12-03 NOTE — BH INPATIENT PSYCHIATRY PROGRESS NOTE - NSBHFUPINTERVALHXFT_PSY_A_CORE
Patient is followed up for psychosis, delusions. Chart, medications and labs reviewed.  Patient is discussed with treatment team. No significant overnight issues. As per staff patient is adherent with medication regimen. Pt denies SIIP, HIIP, or A/VH today. Pt now adamant that she feels safe with Ravi and wants to be discharged to his care today. Pt educated on the use of 911 and going to the nearest ED if safety concerns should arise in the community; pt verbalized understanding. CPK levels WNL 11/17/21. Pt noted to have TD of the trunk/head. Pt discharged to her 's care today.

## 2021-12-03 NOTE — BH INPATIENT PSYCHIATRY PROGRESS NOTE - NSBHROSSYSTEMS_PSY_ALL_CORE
Psychiatric

## 2021-12-03 NOTE — BH DISCHARGE NOTE NURSING/SOCIAL WORK/PSYCH REHAB - PATIENT PORTAL LINK FT
You can access the FollowMyHealth Patient Portal offered by [facility] by registering at the following website: [email or web address]. By joining [employer]’s FollowMyHealth portal, you will also be able to view your health information using other applications (apps) compatible with our system.

## 2021-12-03 NOTE — BH INPATIENT PSYCHIATRY DISCHARGE NOTE - NSDCPROCEDURES_PSY_ALL_CORE
No significant procedures or tests performed during this admission, Significant procedures or tests performed during this admission,

## 2021-12-03 NOTE — BH INPATIENT PSYCHIATRY PROGRESS NOTE - NSBHMSETHTASSOC_PSY_A_CORE
Normal
Loose
Normal
Loose
Loose
Normal
Loose
Normal
Loose
Loose
Normal
Normal

## 2021-12-03 NOTE — BH INPATIENT PSYCHIATRY PROGRESS NOTE - NSTXPSYCHODATEEST_PSY_ALL_CORE
16-Nov-2021
25-Nov-2021
16-Nov-2021
25-Nov-2021
16-Nov-2021
25-Nov-2021

## 2021-12-03 NOTE — BH INPATIENT PSYCHIATRY PROGRESS NOTE - NSTXPROBDCOPLK_PSY_ALL_CORE
DISCHARGE ISSUE - LACK OF APPROPRIATE OUTPATIENT SERVICES

## 2021-12-03 NOTE — BH INPATIENT PSYCHIATRY PROGRESS NOTE - NSTXDISORGGOAL_PSY_ALL_CORE
Will identify 2 coping skills that assist in organizing
Will make at least 3 goal and reality oriented statements during therapy
Will identify 2 coping skills that assist in organizing
Will make at least 3 goal and reality oriented statements during therapy
Will make at least 3 goal and reality oriented statements during therapy
Will identify 2 coping skills that assist in organizing
Will make at least 3 goal and reality oriented statements during therapy
Will identify 2 coping skills that assist in organizing
Will make at least 3 goal and reality oriented statements during therapy
Will identify 2 coping skills that assist in organizing
Will make at least 3 goal and reality oriented statements during therapy
Will make at least 3 goal and reality oriented statements during therapy
Will identify 2 coping skills that assist in organizing
Will identify 2 coping skills that assist in organizing

## 2021-12-03 NOTE — BH INPATIENT PSYCHIATRY PROGRESS NOTE - NSBHATTESTSEENBY_PSY_A_CORE
NP without Attending Psychiatrist
attending Psychiatrist without NP/Trainee

## 2021-12-03 NOTE — BH INPATIENT PSYCHIATRY PROGRESS NOTE - NSBHMETABOLIC_PSY_ALL_CORE_FT
BMI: BMI (kg/m2): 30.9 (11-11-21 @ 06:11)  HbA1c: A1C with Estimated Average Glucose Result: 6.0 % (11-12-21 @ 09:58)    Glucose:   BP: 105/65 (11-17-21 @ 08:51) (105/65 - 105/65)  Lipid Panel: Date/Time: 11-12-21 @ 09:58  Cholesterol, Serum: 145  Direct LDL: --  HDL Cholesterol, Serum: 37  Total Cholesterol/HDL Ration Measurement: --  Triglycerides, Serum: 114  
BMI: BMI (kg/m2): 30.9 (11-11-21 @ 06:11)  HbA1c: A1C with Estimated Average Glucose Result: 6.0 % (11-12-21 @ 09:58)    Glucose:   BP: 105/65 (11-17-21 @ 08:51) (105/65 - 105/65)  Lipid Panel: Date/Time: 11-12-21 @ 09:58  Cholesterol, Serum: 145  Direct LDL: --  HDL Cholesterol, Serum: 37  Total Cholesterol/HDL Ration Measurement: --  Triglycerides, Serum: 114  
BMI: BMI (kg/m2): 30.9 (11-11-21 @ 06:11)  HbA1c: A1C with Estimated Average Glucose Result: 6.0 % (11-12-21 @ 09:58)    Glucose:   BP: --  Lipid Panel: Date/Time: 11-12-21 @ 09:58  Cholesterol, Serum: 145  Direct LDL: --  HDL Cholesterol, Serum: 37  Total Cholesterol/HDL Ration Measurement: --  Triglycerides, Serum: 114  
BMI: BMI (kg/m2): 30.9 (11-11-21 @ 06:11)  HbA1c: A1C with Estimated Average Glucose Result: 6.0 % (11-12-21 @ 09:58)    Glucose:   BP: --  Lipid Panel: Date/Time: 11-12-21 @ 09:58  Cholesterol, Serum: 145  Direct LDL: --  HDL Cholesterol, Serum: 37  Total Cholesterol/HDL Ration Measurement: --  Triglycerides, Serum: 114  
BMI: BMI (kg/m2): 28.1 (11-20-21 @ 17:31)  HbA1c: A1C with Estimated Average Glucose Result: 6.0 % (11-12-21 @ 09:58)    Glucose:   BP: --  Lipid Panel: Date/Time: 11-12-21 @ 09:58  Cholesterol, Serum: 145  Direct LDL: --  HDL Cholesterol, Serum: 37  Total Cholesterol/HDL Ration Measurement: --  Triglycerides, Serum: 114  
BMI: BMI (kg/m2): 28.1 (11-20-21 @ 17:31)  HbA1c: A1C with Estimated Average Glucose Result: 6.0 % (11-12-21 @ 09:58)    Glucose:   BP: --  Lipid Panel: Date/Time: 11-12-21 @ 09:58  Cholesterol, Serum: 145  Direct LDL: --  HDL Cholesterol, Serum: 37  Total Cholesterol/HDL Ration Measurement: --  Triglycerides, Serum: 114  
BMI: BMI (kg/m2): 30.9 (11-11-21 @ 06:11)  HbA1c: A1C with Estimated Average Glucose Result: 6.0 % (11-12-21 @ 09:58)    Glucose:   BP: --  Lipid Panel: Date/Time: 11-12-21 @ 09:58  Cholesterol, Serum: 145  Direct LDL: --  HDL Cholesterol, Serum: 37  Total Cholesterol/HDL Ration Measurement: --  Triglycerides, Serum: 114  
BMI: BMI (kg/m2): 30.9 (11-11-21 @ 06:11)  HbA1c: A1C with Estimated Average Glucose Result: 6.0 % (11-12-21 @ 09:58)    Glucose:   BP: 112/64 (11-11-21 @ 05:33) (111/62 - 115/53)  Lipid Panel: Date/Time: 11-12-21 @ 09:58  Cholesterol, Serum: 145  Direct LDL: --  HDL Cholesterol, Serum: 37  Total Cholesterol/HDL Ration Measurement: --  Triglycerides, Serum: 114  
BMI: BMI (kg/m2): 30.9 (11-11-21 @ 06:11)  HbA1c: A1C with Estimated Average Glucose Result: 6.0 % (11-12-21 @ 09:58)    Glucose:   BP: 112/64 (11-11-21 @ 05:33) (111/62 - 115/53)  Lipid Panel: Date/Time: 11-12-21 @ 09:58  Cholesterol, Serum: 145  Direct LDL: --  HDL Cholesterol, Serum: 37  Total Cholesterol/HDL Ration Measurement: --  Triglycerides, Serum: 114  
BMI: BMI (kg/m2): 28.1 (11-20-21 @ 17:31)  HbA1c: A1C with Estimated Average Glucose Result: 6.0 % (11-12-21 @ 09:58)    Glucose:   BP: 115/62 (11-21-21 @ 08:42) (115/62 - 115/62)  Lipid Panel: Date/Time: 11-12-21 @ 09:58  Cholesterol, Serum: 145  Direct LDL: --  HDL Cholesterol, Serum: 37  Total Cholesterol/HDL Ration Measurement: --  Triglycerides, Serum: 114  
BMI: BMI (kg/m2): 30.9 (11-11-21 @ 06:11)  HbA1c: A1C with Estimated Average Glucose Result: 6.0 % (11-12-21 @ 09:58)    Glucose:   BP: 105/65 (11-17-21 @ 08:51) (105/65 - 105/65)  Lipid Panel: Date/Time: 11-12-21 @ 09:58  Cholesterol, Serum: 145  Direct LDL: --  HDL Cholesterol, Serum: 37  Total Cholesterol/HDL Ration Measurement: --  Triglycerides, Serum: 114  
BMI: BMI (kg/m2): 28.1 (11-20-21 @ 17:31)  HbA1c: A1C with Estimated Average Glucose Result: 6.0 % (11-12-21 @ 09:58)    Glucose:   BP: --  Lipid Panel: Date/Time: 11-12-21 @ 09:58  Cholesterol, Serum: 145  Direct LDL: --  HDL Cholesterol, Serum: 37  Total Cholesterol/HDL Ration Measurement: --  Triglycerides, Serum: 114  
BMI: BMI (kg/m2): 28.1 (11-20-21 @ 17:31)  HbA1c: A1C with Estimated Average Glucose Result: 6.0 % (11-12-21 @ 09:58)    Glucose:   BP: 115/62 (11-21-21 @ 08:42) (115/62 - 115/62)  Lipid Panel: Date/Time: 11-12-21 @ 09:58  Cholesterol, Serum: 145  Direct LDL: --  HDL Cholesterol, Serum: 37  Total Cholesterol/HDL Ration Measurement: --  Triglycerides, Serum: 114  
BMI: BMI (kg/m2): 28.1 (11-20-21 @ 17:31)  HbA1c: A1C with Estimated Average Glucose Result: 6.0 % (11-12-21 @ 09:58)    Glucose:   BP: --  Lipid Panel: Date/Time: 11-12-21 @ 09:58  Cholesterol, Serum: 145  Direct LDL: --  HDL Cholesterol, Serum: 37  Total Cholesterol/HDL Ration Measurement: --  Triglycerides, Serum: 114  

## 2021-12-03 NOTE — BH INPATIENT PSYCHIATRY PROGRESS NOTE - NSBHMSETHTCONTENT_PSY_A_CORE
Unremarkable
Ideas of reference
Unremarkable
Ideas of reference
Unremarkable
Unremarkable

## 2021-12-03 NOTE — BH INPATIENT PSYCHIATRY PROGRESS NOTE - NSTXDISORGPROGRES_PSY_ALL_CORE
No Change
No Change
Improving
Met - goal discontinued
No Change
Improving
Improving
No Change
Improving
Improving

## 2021-12-03 NOTE — BH INPATIENT PSYCHIATRY DISCHARGE NOTE - HPI (INCLUDE ILLNESS QUALITY, SEVERITY, DURATION, TIMING, CONTEXT, MODIFYING FACTORS, ASSOCIATED SIGNS AND SYMPTOMS)
Per ED assessment: "Ms. Francois is a 42yo woman, primarily Mohawk-speaking (writer speaks Mohawk, no  used), ,  no dependents, domiciled with , with PPHx of Intellectual disability, communication disorder, MDD, unspecified psychotic disorder, delusional disorder, and unspecified anxiety, with 4 inpatient admissions (last admission: [facility] 2020 in the setting of running away/unable to care for herself), reports no previous SAs/NSSB, denies any h/o violence/legal issues/substance use, with PMHX of migraines, seizures, dorsalgia, obesity, and h/o dislocation and sprain of joints and ligaments in the lumbar spine and pelvis, was BIBEMS to [facility] after patient was wondering around at a  police precinct.     The patient reports she lives in the [place] with her , Ravi. Notes they got into an argument, with Ravi telling her to go look for her boyfriend. Patient states she did as such, she left the house with the hopes of going to [place] to find Jono, her high school boyfriend. Asked how does she know his location, she responds that “someone told me.” Asked who is this someone, she continues to repeat, “someone, someone.” When asked how she was planning to reach [place], she response “God.” The patient notes she has been  to Rvai for years, they have no children, and her parents and siblings live in [place].   The patient reports her mood as “happy.” Endorses good sleep, appetite, and states she enjoys going to parties and dancing. Denies any feelings of guilt or any current or past SIIP/HIIP/thoughts of self-harm. Denies any current or past AVH, IOR, thought insertion/deletion. Notes her  “follows” her around in the street. Denies any manic symptoms. Reports h/o “seizures attacks,” for which she takes meds, but denies any other medical conditions. Denies any etoh/tobacco/or illicit drug use. Reports allergy to fish (facial and limb edema).     Patient’s currents med: Lamictal 150mg BID, Topiramate 150mg BID, Risperidal 2mg daily and 3mg qHS, and Trihexyphenidyl 2mg BID w/ meals.     Per collateral her , Mr. Ravi Mitchell, patient was has history of seizures and tremors, speech problem from childhood, intellectual disability, and some type of memory problem he is unclear about (perhaps Parkinson's disease based on h/o tremors and use of Artane?). They have been  for more than 10 years. Patient’s mom passed away, father lives in [place], and brother lives in [place]. Per , about two days ago, while he was doing the laundry the patient left the house without telling  where she was going. States patient last year left the house in search of her brother and ended up in [place]. Patient received the COVID19 Martinez vaccine on May 11, 2021."
home

## 2021-12-03 NOTE — BH INPATIENT PSYCHIATRY PROGRESS NOTE - NSBHCONSBHPROVDETAILS_PSY_A_CORE  FT
LARISSA Mills [phone] called, message left, awaiting call back 
LARISSA Mills [phone] called, message left, awaiting call back 
LARISSA Mills [phone] called, message left, awaiting call back 
LARISSA Mills [phone] called, message left, awaiting call back 
LARISSA Mills [phone] called, message left, awaiting call back 
LARISSA Mills [phone] called, message left, awaiting call back 
LARISSA Mills [phone] called, message left, awaiting call back 
LARISSA Mills [phone] called, message left, awaiting call back 
LARISSA Mills [phone] called, message left, awaiting call back 
LARISSA Mills [phone] called, message left, awaiting call back 
LARISSA Mills [phone] called, message left, awaiting call back 
LARISSA Mills [phone] called, message left, awaiting call back 
LARISSA Mills [phone] called, message left, awaiting call back 
LARISSA Mills [phone] called, message left, awaiting call back 
LARISSA Mills [phone] called, message left, awaiting call back 
LARISSA Mills [phone] called, message left, awaiting call back 
LARISSA Mills [phone] called, message left, awaiting call back

## 2021-12-03 NOTE — BH INPATIENT PSYCHIATRY PROGRESS NOTE - PRN MEDS
MEDICATIONS  (PRN):  OLANZapine 5 milliGRAM(s) Oral every 6 hours PRN agitation  OLANZapine Injectable 5 milliGRAM(s) IntraMuscular Once PRN severe agitation  

## 2021-12-03 NOTE — BH INPATIENT PSYCHIATRY PROGRESS NOTE - NSBHMSEAFFCONG_PSY_A_CORE
Congruent
Non-congruent
Congruent
Non-congruent
Congruent
Congruent

## 2021-12-03 NOTE — BH INPATIENT PSYCHIATRY PROGRESS NOTE - NSTXCOPEDATEEST_PSY_ALL_CORE
11-Nov-2021
24-Nov-2021
11-Nov-2021
24-Nov-2021
11-Nov-2021
11-Nov-2021
24-Nov-2021
24-Nov-2021
11-Nov-2021
24-Nov-2021
11-Nov-2021
24-Nov-2021

## 2021-12-03 NOTE — BH INPATIENT PSYCHIATRY PROGRESS NOTE - NSTXIMPULSDATEEST_PSY_ALL_CORE
24-Nov-2021
16-Nov-2021
24-Nov-2021
16-Nov-2021

## 2021-12-03 NOTE — BH INPATIENT PSYCHIATRY PROGRESS NOTE - NSTXDCOPLKINTERMD_PSY_ALL_CORE
psychoed 

## 2021-12-03 NOTE — BH INPATIENT PSYCHIATRY PROGRESS NOTE - NSTXDISORGDATETRGT_PSY_ALL_CORE
18-Nov-2021
07-Dec-2021
25-Nov-2021
18-Nov-2021
25-Nov-2021
03-Dec-2021
18-Nov-2021
01-Dec-2021
25-Nov-2021
01-Dec-2021
18-Nov-2021
01-Dec-2021
25-Nov-2021
18-Nov-2021
07-Dec-2021

## 2021-12-03 NOTE — BH INPATIENT PSYCHIATRY PROGRESS NOTE - NSICDXBHPRIMARYDX_PSY_ALL_CORE
Psychosis   F29  

## 2021-12-03 NOTE — BH INPATIENT PSYCHIATRY DISCHARGE NOTE - DESCRIPTION
pt lives with  in the [place], her mother passed away, her father lives in [place] and her brother lives in [place]

## 2021-12-03 NOTE — BH INPATIENT PSYCHIATRY PROGRESS NOTE - NSBHMSESPEECH_PSY_A_CORE
Abnormal as indicated, otherwise normal...

## 2021-12-03 NOTE — BH DISCHARGE NOTE NURSING/SOCIAL WORK/PSYCH REHAB - NSCDUDCCRISIS_PSY_A_CORE
[facility] Well  1 [phone]) [facility]-WELL ([phone])  Text "WELL" to 72160  Website: www.[employer]/.[employer] 1 (713) 511-TXOZ (0901) Website: www.safehorizon.org/.National Suicide Prevention Lifeline 2 (760) 273-3505/.  [employer]  1 (808) LIFENET (567-7481)/.  [facility]  [address] [place] [address] [phone]   Hours:  Monday through Friday from 9 AM to 3 PM/.  [employer] - [facility] Crisis Line  9 [phone], Option 1

## 2021-12-03 NOTE — BH DISCHARGE NOTE NURSING/SOCIAL WORK/PSYCH REHAB - NSDCPRRECOMMEND_PSY_ALL_CORE
Psychiatric Rehabilitation staff recommends that the patient engage in outpatient services for continued medication and symptom management. Upon discharge, patient has an appointment scheduled with [facility] december 6th at 11am

## 2021-12-03 NOTE — BH INPATIENT PSYCHIATRY DISCHARGE NOTE - NSBHMETABOLIC_PSY_ALL_CORE_FT
BMI: BMI (kg/m2): 28.1 (11-20-21 @ 17:31)  HbA1c: A1C with Estimated Average Glucose Result: 6.0 % (11-12-21 @ 09:58)    Glucose:   BP: --  Lipid Panel: Date/Time: 11-12-21 @ 09:58  Cholesterol, Serum: 145  Direct LDL: --  HDL Cholesterol, Serum: 37  Total Cholesterol/HDL Ration Measurement: --  Triglycerides, Serum: 114

## 2021-12-03 NOTE — BH INPATIENT PSYCHIATRY PROGRESS NOTE - NSTXCOPEDATETRGT_PSY_ALL_CORE
18-Nov-2021
07-Dec-2021
18-Nov-2021
18-Nov-2021
01-Dec-2021
18-Nov-2021
18-Nov-2021
01-Dec-2021
07-Dec-2021
18-Nov-2021
01-Dec-2021
18-Nov-2021
07-Dec-2021

## 2021-12-03 NOTE — BH DISCHARGE NOTE NURSING/SOCIAL WORK/PSYCH REHAB - NSDCPRGOAL_PSY_ALL_CORE
Writer met with patient to safety plan for discharge and discuss the patient’s progress throughout the inpatient stay. Patient was receptive to safety planning and readily identified coping skills, triggers, social support, and reasons for living. Upon admission pt. was very disorganized and hyper fixated on her boyfriend of 20 years ago. During in patient stay pt. was visible on the unit, engaged with select peers and was compliant with staff. Pt. attended about 60% of the groups. At the start of stay pt. would not attend groups but continued to participate more towards her discharge. Pt. was able to communicate in a more organized way. Despite cognitive limitations and language barriers pt. engaged to the best of her abilities in the group settings.    At discharge, the patient presents as more organized and reported being happy for discharge and going back home to her . The patient met her psychiatric rehabilitation goal of identifying coping skills evidenced by her completion of the safety plan. The patient exhibits medication compliance, fair ADLs, and behavioral control. The patient denies SI/HI/AH/VH.

## 2021-12-03 NOTE — BH INPATIENT PSYCHIATRY PROGRESS NOTE - NSTXPSYCHODATETRGT_PSY_ALL_CORE
01-Dec-2021
23-Nov-2021
07-Dec-2021
23-Nov-2021
07-Dec-2021
01-Dec-2021
23-Nov-2021
07-Dec-2021
23-Nov-2021
01-Dec-2021

## 2021-12-03 NOTE — BH INPATIENT PSYCHIATRY PROGRESS NOTE - NSBHFUPINTERVALCCFT_PSY_A_CORE
delusions
delusions
Pt seen f/u for delusions
Pt seen f/u for delusions
delusions
Patient is followed up for psychosis, delusions. Patient reported" where is my doctor"

## 2021-12-03 NOTE — BH INPATIENT PSYCHIATRY PROGRESS NOTE - NSTXDCOPLKPROGRES_PSY_ALL_CORE
No Change
Met - goal discontinued

## 2021-12-03 NOTE — BH INPATIENT PSYCHIATRY DISCHARGE NOTE - NSBHDCHANDOFFFT_PSY_ALL_CORE
Pt was referred to [facility] with intake on 12/6/21. Pt has not yet been assigned a provider. Clinic was faxed a copy of the discharge summary, medication list, and this writer's contact # [phone]

## 2021-12-03 NOTE — BH INPATIENT PSYCHIATRY PROGRESS NOTE - NSTXCOPEINTERMD_PSY_ALL_CORE
psychoed

## 2021-12-03 NOTE — BH INPATIENT PSYCHIATRY PROGRESS NOTE - NSBHMSEMOVE_PSY_A_CORE
Other
No abnormal movements
Other
Other
No abnormal movements
No abnormal movements
Other
No abnormal movements
Other
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
## 2021-12-03 NOTE — BH INPATIENT PSYCHIATRY PROGRESS NOTE - NSBHCHARTREVIEWVS_PSY_A_CORE FT
Vital Signs Last 24 Hrs  T(C): 36.3 (11-15-21 @ 06:24), Max: 36.7 (11-14-21 @ 19:39)  T(F): 97.4 (11-15-21 @ 06:24), Max: 98 (11-14-21 @ 19:39)  HR: --  BP: --  BP(mean): --  RR: --  SpO2: --    Orthostatic VS  11-15-21 @ 06:24  Lying BP: --/-- HR: --  Sitting BP: 104/60 HR: 81  Standing BP: --/-- HR: --  Site: --  Mode: --  Orthostatic VS  11-14-21 @ 19:39  Lying BP: --/-- HR: --  Sitting BP: 126/73 HR: 92  Standing BP: 124/71 HR: 103  Site: upper left arm  Mode: electronic  Orthostatic VS  11-14-21 @ 08:20  Lying BP: --/-- HR: --  Sitting BP: 102/61 HR: 86  Standing BP: --/-- HR: --  Site: --  Mode: --  Orthostatic VS  11-13-21 @ 19:47  Lying BP: --/-- HR: --  Sitting BP: 100/83 HR: 99  Standing BP: --/-- HR: --  Site: --  Mode: --  
Vital Signs Last 24 Hrs  T(C): 36.2 (11-29-21 @ 20:18), Max: 36.2 (11-29-21 @ 20:18)  T(F): 97.1 (11-29-21 @ 20:18), Max: 97.1 (11-29-21 @ 20:18)  HR: --  BP: --  BP(mean): --  RR: --  SpO2: --    Orthostatic VS  11-29-21 @ 08:59  Lying BP: --/-- HR: --  Sitting BP: 104/65 HR: 78  Standing BP: --/-- HR: --  Site: --  Mode: --  
Vital Signs Last 24 Hrs  T(C): 36.5 (11-19-21 @ 07:58), Max: 36.5 (11-19-21 @ 07:58)  T(F): 97.7 (11-19-21 @ 07:58), Max: 97.7 (11-19-21 @ 07:58)  HR: --  BP: --  BP(mean): --  RR: --  SpO2: --    Orthostatic VS  11-19-21 @ 07:58  Lying BP: --/-- HR: --  Sitting BP: 121/70 HR: 73  Standing BP: --/-- HR: --  Site: upper left arm  Mode: electronic  Orthostatic VS  11-18-21 @ 08:27  Lying BP: --/-- HR: --  Sitting BP: 124/74 HR: 71  Standing BP: --/-- HR: --  Site: --  Mode: --  
Vital Signs Last 24 Hrs  T(C): 36.3 (11-23-21 @ 08:34), Max: 36.3 (11-23-21 @ 08:34)  T(F): 97.3 (11-23-21 @ 08:34), Max: 97.3 (11-23-21 @ 08:34)  HR: --  BP: --  BP(mean): --  RR: --  SpO2: --    Orthostatic VS  11-23-21 @ 08:34  Lying BP: --/-- HR: --  Sitting BP: 119/63 HR: 74  Standing BP: --/-- HR: --  Site: --  Mode: --  
Vital Signs Last 24 Hrs  T(C): 36.6 (11-13-21 @ 08:02), Max: 36.6 (11-12-21 @ 20:19)  T(F): 97.8 (11-13-21 @ 08:02), Max: 97.9 (11-12-21 @ 20:19)  HR: --  BP: --  BP(mean): --  RR: --  SpO2: --    Orthostatic VS  11-13-21 @ 08:02  Lying BP: --/-- HR: --  Sitting BP: 90/60 HR: 78  Standing BP: --/-- HR: --  Site: --  Mode: --  Orthostatic VS  11-13-21 @ 06:08  Lying BP: --/-- HR: --  Sitting BP: 90/60 HR: 78  Standing BP: --/-- HR: --  Site: --  Mode: --  Orthostatic VS  11-12-21 @ 20:19  Lying BP: --/-- HR: --  Sitting BP: 117/75 HR: 81  Standing BP: --/-- HR: --  Site: --  Mode: --  Orthostatic VS  11-12-21 @ 08:48  Lying BP: --/-- HR: --  Sitting BP: 99/63 HR: 86  Standing BP: --/-- HR: --  Site: --  Mode: --  Orthostatic VS  11-11-21 @ 20:20  Lying BP: --/-- HR: --  Sitting BP: 99/60 HR: 71  Standing BP: 116/68 HR: 84  Site: --  Mode: --  
Vital Signs Last 24 Hrs  T(C): 36.4 (11-26-21 @ 08:09), Max: 36.4 (11-26-21 @ 08:09)  T(F): 97.5 (11-26-21 @ 08:09), Max: 97.5 (11-26-21 @ 08:09)  HR: --  BP: --  BP(mean): --  RR: --  SpO2: --    Orthostatic VS  11-26-21 @ 08:09  Lying BP: --/-- HR: --  Sitting BP: 109/59 HR: 78  Standing BP: --/-- HR: --  Site: --  Mode: --  Orthostatic VS  11-25-21 @ 08:28  Lying BP: --/-- HR: --  Sitting BP: 116/71 HR: 89  Standing BP: --/-- HR: --  Site: --  Mode: --  
Vital Signs Last 24 Hrs  T(C): 36.3 (11-16-21 @ 09:58), Max: 36.3 (11-16-21 @ 09:58)  T(F): 97.4 (11-16-21 @ 09:58), Max: 97.4 (11-16-21 @ 09:58)  HR: --  BP: --  BP(mean): --  RR: --  SpO2: --    Orthostatic VS  11-16-21 @ 09:58  Lying BP: --/-- HR: --  Sitting BP: 126/82 HR: 85  Standing BP: 122/86 HR: 84  Site: --  Mode: --  Orthostatic VS  11-15-21 @ 06:24  Lying BP: --/-- HR: --  Sitting BP: 104/60 HR: 81  Standing BP: --/-- HR: --  Site: --  Mode: --  Orthostatic VS  11-14-21 @ 19:39  Lying BP: --/-- HR: --  Sitting BP: 126/73 HR: 92  Standing BP: 124/71 HR: 103  Site: upper left arm  Mode: electronic  
Vital Signs Last 24 Hrs  T(C): 36.4 (11-18-21 @ 08:27), Max: 36.4 (11-18-21 @ 08:27)  T(F): 97.5 (11-18-21 @ 08:27), Max: 97.5 (11-18-21 @ 08:27)  HR: --  BP: --  BP(mean): --  RR: --  SpO2: --    Orthostatic VS  11-18-21 @ 08:27  Lying BP: --/-- HR: --  Sitting BP: 124/74 HR: 71  Standing BP: --/-- HR: --  Site: --  Mode: --  
Vital Signs Last 24 Hrs  T(C): 36.3 (11-14-21 @ 08:20), Max: 36.3 (11-14-21 @ 08:20)  T(F): 97.3 (11-14-21 @ 08:20), Max: 97.3 (11-14-21 @ 08:20)  HR: --  BP: --  BP(mean): --  RR: --  SpO2: --    Orthostatic VS  11-14-21 @ 08:20  Lying BP: --/-- HR: --  Sitting BP: 102/61 HR: 86  Standing BP: --/-- HR: --  Site: --  Mode: --  Orthostatic VS  11-13-21 @ 19:47  Lying BP: --/-- HR: --  Sitting BP: 100/83 HR: 99  Standing BP: --/-- HR: --  Site: --  Mode: --  Orthostatic VS  11-13-21 @ 08:02  Lying BP: --/-- HR: --  Sitting BP: 90/60 HR: 78  Standing BP: --/-- HR: --  Site: --  Mode: --  Orthostatic VS  11-13-21 @ 06:08  Lying BP: --/-- HR: --  Sitting BP: 90/60 HR: 78  Standing BP: --/-- HR: --  Site: --  Mode: --  Orthostatic VS  11-12-21 @ 20:19  Lying BP: --/-- HR: --  Sitting BP: 117/75 HR: 81  Standing BP: --/-- HR: --  Site: --  Mode: --  
Vital Signs Last 24 Hrs  T(C): 36.4 (12-02-21 @ 08:29), Max: 36.4 (12-02-21 @ 08:29)  T(F): 97.5 (12-02-21 @ 08:29), Max: 97.5 (12-02-21 @ 08:29)  HR: --  BP: --  BP(mean): --  RR: --  SpO2: --    Orthostatic VS  12-02-21 @ 08:29  Lying BP: --/-- HR: --  Sitting BP: 108/75 HR: 112  Standing BP: --/-- HR: --  Site: --  Mode: --  Orthostatic VS  12-01-21 @ 08:02  Lying BP: --/-- HR: --  Sitting BP: 114/73 HR: 82  Standing BP: --/-- HR: --  Site: --  Mode: --  
Vital Signs Last 24 Hrs  T(C): 36.7 (11-29-21 @ 08:59), Max: 36.7 (11-29-21 @ 08:59)  T(F): 98 (11-29-21 @ 08:59), Max: 98 (11-29-21 @ 08:59)  HR: --  BP: --  BP(mean): --  RR: --  SpO2: --    Orthostatic VS  11-29-21 @ 08:59  Lying BP: --/-- HR: --  Sitting BP: 104/65 HR: 78  Standing BP: --/-- HR: --  Site: --  Mode: --  Orthostatic VS  11-28-21 @ 08:59  Lying BP: --/-- HR: --  Sitting BP: 104/56 HR: 93  Standing BP: --/-- HR: --  Site: --  Mode: --  Orthostatic VS  11-27-21 @ 19:18  Lying BP: --/-- HR: --  Sitting BP: 112/77 HR: 104  Standing BP: 122/87 HR: 117  Site: --  Mode: --  
Vital Signs Last 24 Hrs  T(C): 36.6 (11-24-21 @ 08:06), Max: 36.6 (11-24-21 @ 08:06)  T(F): 97.9 (11-24-21 @ 08:06), Max: 97.9 (11-24-21 @ 08:06)  HR: --  BP: --  BP(mean): --  RR: --  SpO2: --    Orthostatic VS  11-24-21 @ 08:06  Lying BP: --/-- HR: --  Sitting BP: 111/56 HR: 76  Standing BP: --/-- HR: --  Site: --  Mode: --  Orthostatic VS  11-23-21 @ 08:34  Lying BP: --/-- HR: --  Sitting BP: 119/63 HR: 74  Standing BP: --/-- HR: --  Site: --  Mode: --  
Vital Signs Last 24 Hrs  T(C): 36.4 (11-12-21 @ 08:48), Max: 36.4 (11-12-21 @ 08:48)  T(F): 97.5 (11-12-21 @ 08:48), Max: 97.5 (11-12-21 @ 08:48)  HR: --  BP: --  BP(mean): --  RR: --  SpO2: --    Orthostatic VS  11-12-21 @ 08:48  Lying BP: --/-- HR: --  Sitting BP: 99/63 HR: 86  Standing BP: --/-- HR: --  Site: --  Mode: --  Orthostatic VS  11-11-21 @ 20:20  Lying BP: --/-- HR: --  Sitting BP: 99/60 HR: 71  Standing BP: 116/68 HR: 84  Site: --  Mode: --  Orthostatic VS  11-11-21 @ 06:11  Lying BP: --/-- HR: --  Sitting BP: 107/59 HR: 70  Standing BP: 109/64 HR: 101  Site: --  Mode: --  
Vital Signs Last 24 Hrs  T(C): 36.3 (11-17-21 @ 08:51), Max: 36.8 (11-16-21 @ 20:50)  T(F): 97.4 (11-17-21 @ 08:51), Max: 98.2 (11-16-21 @ 20:50)  HR: 80 (11-17-21 @ 08:51) (80 - 80)  BP: 105/65 (11-17-21 @ 08:51) (105/65 - 105/65)  BP(mean): --  RR: --  SpO2: --    Orthostatic VS  11-16-21 @ 09:58  Lying BP: --/-- HR: --  Sitting BP: 126/82 HR: 85  Standing BP: 122/86 HR: 84  Site: --  Mode: --  
Vital Signs Last 24 Hrs  T(C): 36.4 (12-01-21 @ 08:02), Max: 36.6 (11-30-21 @ 19:46)  T(F): 97.5 (12-01-21 @ 08:02), Max: 97.8 (11-30-21 @ 19:46)  HR: --  BP: --  BP(mean): --  RR: --  SpO2: --    Orthostatic VS  12-01-21 @ 08:02  Lying BP: --/-- HR: --  Sitting BP: 114/73 HR: 82  Standing BP: --/-- HR: --  Site: --  Mode: --  
Vital Signs Last 24 Hrs  T(C): 36.2 (11-21-21 @ 19:34), Max: 36.2 (11-21-21 @ 19:34)  T(F): 97.2 (11-21-21 @ 19:34), Max: 97.2 (11-21-21 @ 19:34)  HR: --  BP: --  BP(mean): --  RR: --  SpO2: --    
Vital Signs Last 24 Hrs  T(C): 36.4 (12-03-21 @ 08:18), Max: 36.4 (12-03-21 @ 08:18)  T(F): 97.6 (12-03-21 @ 08:18), Max: 97.6 (12-03-21 @ 08:18)  HR: --  BP: --  BP(mean): --  RR: --  SpO2: --    Orthostatic VS  12-03-21 @ 08:18  Lying BP: --/-- HR: --  Sitting BP: 122/77 HR: 84  Standing BP: --/-- HR: --  Site: --  Mode: --  Orthostatic VS  12-02-21 @ 08:29  Lying BP: --/-- HR: --  Sitting BP: 108/75 HR: 112  Standing BP: --/-- HR: --  Site: --  Mode: --
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
## 2021-12-03 NOTE — BH INPATIENT PSYCHIATRY PROGRESS NOTE - NSTXDCOPLKDATEEST_PSY_ALL_CORE
19-Nov-2021
26-Nov-2021
11-Nov-2021
26-Nov-2021
11-Nov-2021
19-Nov-2021
19-Nov-2021
11-Nov-2021
11-Nov-2021
19-Nov-2021
26-Nov-2021

## 2021-12-03 NOTE — BH INPATIENT PSYCHIATRY PROGRESS NOTE - NSTXDISORGDATEEST_PSY_ALL_CORE
11-Nov-2021
24-Nov-2021
24-Nov-2021
11-Nov-2021
24-Nov-2021
11-Nov-2021
24-Nov-2021
24-Nov-2021

## 2021-12-03 NOTE — BH INPATIENT PSYCHIATRY PROGRESS NOTE - NSTXPSYCHOGOAL_PSY_ALL_CORE
Will be able to report experiencing hallucinations to staff

## 2021-12-03 NOTE — BH INPATIENT PSYCHIATRY PROGRESS NOTE - NSBHCONSULTIPREASON_PSY_A_CORE
danger to self; mental illness expected to respond to inpatient care

## 2021-12-03 NOTE — BH INPATIENT PSYCHIATRY PROGRESS NOTE - NSTXDCOPLKGOAL_PSY_ALL_CORE
Will agree to consider an appropriate level of outpatient care

## 2021-12-03 NOTE — BH INPATIENT PSYCHIATRY PROGRESS NOTE - GENERAL APPEARANCE
Developmentally delayed
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
## 2021-12-03 NOTE — BH INPATIENT PSYCHIATRY PROGRESS NOTE - NSTXIMPULSINTERMD_PSY_ALL_CORE
medication management 

## 2021-12-03 NOTE — BH DISCHARGE NOTE NURSING/SOCIAL WORK/PSYCH REHAB - DISCHARGE INSTRUCTIONS AFTERCARE APPOINTMENTS
In order to check the location, date, or time of your aftercare appointment, please refer to your Discharge Instructions Document given to you upon leaving the hospital.  If you have lost the instructions please call [phone]

## 2021-12-03 NOTE — BH INPATIENT PSYCHIATRY PROGRESS NOTE - NSTXDCOPLKDATENEW_PSY_ALL_CORE
18-Nov-2021

## 2021-12-03 NOTE — BH INPATIENT PSYCHIATRY PROGRESS NOTE - NSTXIMPULSDATETRGT_PSY_ALL_CORE
23-Nov-2021
01-Dec-2021
01-Dec-2021
07-Dec-2021
23-Nov-2021
07-Dec-2021
07-Dec-2021
23-Nov-2021
01-Dec-2021
23-Nov-2021

## 2021-12-03 NOTE — BH INPATIENT PSYCHIATRY PROGRESS NOTE - NSTXIMPULSGOAL_PSY_ALL_CORE
Will be able to demonstrate the ability to pause before acting out negatively

## 2021-12-03 NOTE — BH INPATIENT PSYCHIATRY PROGRESS NOTE - NSTXDCOPLKDATETRGT_PSY_ALL_CORE
19-Nov-2021
19-Nov-2021
18-Nov-2021
03-Dec-2021
18-Nov-2021
18-Nov-2021
19-Nov-2021
18-Nov-2021
03-Dec-2021
03-Dec-2021
18-Nov-2021
03-Dec-2021
19-Nov-2021
03-Dec-2021
03-Dec-2021

## 2021-12-03 NOTE — BH INPATIENT PSYCHIATRY DISCHARGE NOTE - NSDCMRMEDTOKEN_GEN_ALL_CORE_FT
lamoTRIgine 150 mg oral tablet: 1 tab(s) orally 2 times a day  risperiDONE 3 mg oral tablet: 1 tab(s) orally 2 times a day   Topamax 50 mg oral tablet: 3 tab(s) orally 2 times a day  trihexyphenidyl 2 mg oral tablet: 1 tab(s) orally 2 times a day

## 2021-12-16 ENCOUNTER — OUTPATIENT (OUTPATIENT)
Dept: OUTPATIENT SERVICES | Facility: HOSPITAL | Age: 41
LOS: 1 days | Discharge: HOME | End: 2021-12-16

## 2021-12-16 PROBLEM — F31.9 BIPOLAR DISORDER, UNSPECIFIED: Chronic | Status: ACTIVE | Noted: 2021-11-10

## 2021-12-16 PROBLEM — R56.9 UNSPECIFIED CONVULSIONS: Chronic | Status: ACTIVE | Noted: 2021-11-10

## 2021-12-21 NOTE — SOCIAL WORK POST DISCHARGE FOLLOW UP NOTE - NSBHSWFOLLOWUP_PSY_ALL_CORE_FT
Writer attempted to contact pt and : Ravi Smallwood [phone] however left message with no response. Left message offering assistance. Noted pt went to crisis center after discharge. Pt was stable when discharged from the hospital. Pt familiar with emergency contacts and community supports

## 2021-12-27 DIAGNOSIS — F71 MODERATE INTELLECTUAL DISABILITIES: ICD-10-CM

## 2022-02-17 NOTE — BH TREATMENT PLAN - NSTXCAREGIVERPARTICIPATE_PSY_P_CORE
Dear Anny,     My name is JUAN Grewal, and I recently had the pleasure of evaluating information related to your medical condition on our [employer] digital platform. Based on the information available, I have determined that your request for care was unable to be safely and effectively completed. Due to this, it is my recommendation that you seek care by contacting the primary care provider or specialty provider who regularly manages this condition.     Please note that the Voicebase department that you submitted your request for care is equivalent to a virtual format of Urgent Care or Immediate Care level of care. If you did not intend to seek this particular level of care and potentially had a scheduled appointment with another provider, we recommend that you directly call the office of the provider you are attempting to meet with to rectify connection issues.     Your care is very important to us. Please do not delay in acting on the recommendations for your care listed above. If you feel you may be experiencing a medical emergency, contact 911 immediately. If you have any questions regarding your visit, you may contact us at [phone].     Thank You,     JUAN Grewal   
Family/Caregiver participated in identification of needs/problems/goals for treatment
No, patient unwilling to involve family/caregiver
Family/Caregiver participated in identification of needs/problems/goals for treatment
Family/Caregiver participated in identification of needs/problems/goals for treatment

## 2022-10-18 NOTE — BH TREATMENT PLAN - NSTXDISORGINTERRN_PSY_ALL_CORE
What Type Of Note Output Would You Prefer (Optional)?: Standard Output How Severe Is Your Skin Lesion?: moderate Has Your Skin Lesion Been Treated?: not been treated Is This A New Presentation, Or A Follow-Up?: Skin Lesion Assess thought process daily  Monitor medication compliance and response  Maintain reality orientation daily   Maintain safe and therapeutic process

## 2024-06-11 NOTE — BH DISCHARGE NOTE NURSING/SOCIAL WORK/PSYCH REHAB - MODE OF TRANSPORTATION
[NS_DeliveryAttending1_OBGYN_ALL_OB_FT:[id number]],[NS_DeliveryAssist1_OBGYN_ALL_OB_FT:[id number]],[NS_DeliveryRN_OBGYN_ALL_OB_FT:[id number]]
Ambulatory